# Patient Record
Sex: FEMALE | Race: WHITE | Employment: FULL TIME | URBAN - METROPOLITAN AREA
[De-identification: names, ages, dates, MRNs, and addresses within clinical notes are randomized per-mention and may not be internally consistent; named-entity substitution may affect disease eponyms.]

---

## 2017-03-30 DIAGNOSIS — I10 UNSPECIFIED ESSENTIAL HYPERTENSION: ICD-10-CM

## 2017-03-31 RX ORDER — ATENOLOL 50 MG/1
TABLET ORAL
Qty: 30 TABLET | Refills: 2 | Status: SHIPPED | OUTPATIENT
Start: 2017-03-31 | End: 2017-06-26 | Stop reason: SDUPTHER

## 2017-03-31 RX ORDER — NIFEDIPINE 60 MG/1
TABLET, FILM COATED, EXTENDED RELEASE ORAL
Qty: 30 TABLET | Refills: 2 | Status: SHIPPED | OUTPATIENT
Start: 2017-03-31 | End: 2017-06-26 | Stop reason: SDUPTHER

## 2017-06-26 DIAGNOSIS — I10 UNSPECIFIED ESSENTIAL HYPERTENSION: ICD-10-CM

## 2017-06-26 RX ORDER — ATENOLOL 50 MG/1
TABLET ORAL
Qty: 30 TABLET | Refills: 0 | Status: SHIPPED | OUTPATIENT
Start: 2017-06-26 | End: 2017-07-26 | Stop reason: SDUPTHER

## 2017-06-26 RX ORDER — NIFEDIPINE 60 MG/1
TABLET, FILM COATED, EXTENDED RELEASE ORAL
Qty: 30 TABLET | Refills: 0 | Status: SHIPPED | OUTPATIENT
Start: 2017-06-26 | End: 2017-07-22 | Stop reason: SDUPTHER

## 2017-07-22 DIAGNOSIS — I10 UNSPECIFIED ESSENTIAL HYPERTENSION: ICD-10-CM

## 2017-07-24 RX ORDER — ATENOLOL 50 MG/1
TABLET ORAL
Qty: 30 TABLET | Refills: 0 | OUTPATIENT
Start: 2017-07-24

## 2017-07-24 RX ORDER — NIFEDIPINE 60 MG/1
TABLET, FILM COATED, EXTENDED RELEASE ORAL
Qty: 30 TABLET | Refills: 0 | Status: SHIPPED | OUTPATIENT
Start: 2017-07-24 | End: 2017-08-31 | Stop reason: SDUPTHER

## 2017-07-26 DIAGNOSIS — I10 UNSPECIFIED ESSENTIAL HYPERTENSION: ICD-10-CM

## 2017-07-26 RX ORDER — NIFEDIPINE 60 MG/1
TABLET, FILM COATED, EXTENDED RELEASE ORAL
Qty: 30 TABLET | Refills: 0 | OUTPATIENT
Start: 2017-07-26

## 2017-07-26 RX ORDER — ATENOLOL 50 MG/1
TABLET ORAL
Qty: 30 TABLET | Refills: 0 | Status: SHIPPED | OUTPATIENT
Start: 2017-07-26 | End: 2017-09-26 | Stop reason: SDUPTHER

## 2017-08-28 ENCOUNTER — OFFICE VISIT (OUTPATIENT)
Dept: FAMILY MEDICINE CLINIC | Age: 52
End: 2017-08-28
Payer: COMMERCIAL

## 2017-08-28 VITALS
DIASTOLIC BLOOD PRESSURE: 78 MMHG | OXYGEN SATURATION: 97 % | WEIGHT: 200 LBS | BODY MASS INDEX: 37.76 KG/M2 | HEART RATE: 73 BPM | TEMPERATURE: 97.9 F | HEIGHT: 61 IN | SYSTOLIC BLOOD PRESSURE: 124 MMHG

## 2017-08-28 DIAGNOSIS — I10 ESSENTIAL HYPERTENSION: Primary | ICD-10-CM

## 2017-08-28 DIAGNOSIS — E78.5 HYPERLIPIDEMIA, UNSPECIFIED HYPERLIPIDEMIA TYPE: ICD-10-CM

## 2017-08-28 DIAGNOSIS — R05.9 COUGH: ICD-10-CM

## 2017-08-28 DIAGNOSIS — R53.83 FATIGUE, UNSPECIFIED TYPE: ICD-10-CM

## 2017-08-28 PROCEDURE — 99213 OFFICE O/P EST LOW 20 MIN: CPT | Performed by: FAMILY MEDICINE

## 2017-08-28 RX ORDER — CHLORHEXIDINE GLUCONATE 0.12 MG/ML
RINSE ORAL
Refills: 1 | COMMUNITY
Start: 2017-06-01

## 2017-08-28 ASSESSMENT — PATIENT HEALTH QUESTIONNAIRE - PHQ9
1. LITTLE INTEREST OR PLEASURE IN DOING THINGS: 0
SUM OF ALL RESPONSES TO PHQ9 QUESTIONS 1 & 2: 0
2. FEELING DOWN, DEPRESSED OR HOPELESS: 0
SUM OF ALL RESPONSES TO PHQ QUESTIONS 1-9: 0

## 2017-08-28 ASSESSMENT — ENCOUNTER SYMPTOMS
SORE THROAT: 0
NAUSEA: 0
CONSTIPATION: 0
SHORTNESS OF BREATH: 0
ABDOMINAL PAIN: 0

## 2017-08-31 DIAGNOSIS — I10 UNSPECIFIED ESSENTIAL HYPERTENSION: ICD-10-CM

## 2017-08-31 RX ORDER — NIFEDIPINE 60 MG/1
TABLET, FILM COATED, EXTENDED RELEASE ORAL
Qty: 30 TABLET | Refills: 2 | Status: SHIPPED | OUTPATIENT
Start: 2017-08-31 | End: 2017-12-28 | Stop reason: SDUPTHER

## 2017-08-31 RX ORDER — METOPROLOL SUCCINATE 50 MG/1
TABLET, EXTENDED RELEASE ORAL
Qty: 30 TABLET | Refills: 2 | Status: SHIPPED | OUTPATIENT
Start: 2017-08-31 | End: 2018-05-10 | Stop reason: ALTCHOICE

## 2017-09-26 RX ORDER — ATENOLOL 50 MG/1
TABLET ORAL
Qty: 30 TABLET | Refills: 2 | Status: SHIPPED | OUTPATIENT
Start: 2017-09-26 | End: 2017-12-28 | Stop reason: SDUPTHER

## 2017-10-20 ENCOUNTER — HOSPITAL ENCOUNTER (OUTPATIENT)
Age: 52
Discharge: HOME | End: 2017-10-20
Payer: COMMERCIAL

## 2017-10-20 DIAGNOSIS — I10 ESSENTIAL HYPERTENSION: ICD-10-CM

## 2017-10-20 DIAGNOSIS — E78.5 HYPERLIPIDEMIA, UNSPECIFIED HYPERLIPIDEMIA TYPE: ICD-10-CM

## 2017-10-20 DIAGNOSIS — R53.83 FATIGUE, UNSPECIFIED TYPE: ICD-10-CM

## 2017-10-20 LAB
ANION GAP SERPL CALCULATED.3IONS-SCNC: 10 MMOL/L (ref 9–17)
BUN BLDV-MCNC: 12 MG/DL (ref 6–20)
BUN/CREAT BLD: ABNORMAL (ref 9–20)
CALCIUM SERPL-MCNC: 9.1 MG/DL (ref 8.6–10.4)
CHLORIDE BLD-SCNC: 104 MMOL/L (ref 98–107)
CHOLESTEROL/HDL RATIO: 3.9
CHOLESTEROL: 210 MG/DL
CO2: 27 MMOL/L (ref 20–31)
CREAT SERPL-MCNC: 0.57 MG/DL (ref 0.5–0.9)
GFR AFRICAN AMERICAN: >60 ML/MIN
GFR NON-AFRICAN AMERICAN: >60 ML/MIN
GFR SERPL CREATININE-BSD FRML MDRD: ABNORMAL ML/MIN/{1.73_M2}
GFR SERPL CREATININE-BSD FRML MDRD: ABNORMAL ML/MIN/{1.73_M2}
GLUCOSE BLD-MCNC: 100 MG/DL (ref 70–99)
HCT VFR BLD CALC: 41.5 % (ref 36–46)
HDLC SERPL-MCNC: 54 MG/DL
HEMOGLOBIN: 14 G/DL (ref 12–16)
LDL CHOLESTEROL: 119 MG/DL (ref 0–130)
MCH RBC QN AUTO: 28.7 PG (ref 26–34)
MCHC RBC AUTO-ENTMCNC: 33.7 G/DL (ref 31–37)
MCV RBC AUTO: 85.3 FL (ref 80–100)
PDW BLD-RTO: 13 % (ref 11.5–14.9)
PLATELET # BLD: 274 K/UL (ref 150–450)
PMV BLD AUTO: 8.4 FL (ref 6–12)
POTASSIUM SERPL-SCNC: 4.1 MMOL/L (ref 3.7–5.3)
RBC # BLD: 4.87 M/UL (ref 4–5.2)
SODIUM BLD-SCNC: 141 MMOL/L (ref 135–144)
TRIGL SERPL-MCNC: 185 MG/DL
VLDLC SERPL CALC-MCNC: ABNORMAL MG/DL (ref 1–30)
WBC # BLD: 6.8 K/UL (ref 3.5–11)

## 2017-10-20 PROCEDURE — 85027 COMPLETE CBC AUTOMATED: CPT

## 2017-10-20 PROCEDURE — 80048 BASIC METABOLIC PNL TOTAL CA: CPT

## 2017-10-20 PROCEDURE — 36415 COLL VENOUS BLD VENIPUNCTURE: CPT

## 2017-10-20 PROCEDURE — 80061 LIPID PANEL: CPT

## 2017-11-14 ENCOUNTER — HOSPITAL ENCOUNTER (EMERGENCY)
Age: 52
Discharge: HOME | End: 2017-11-14
Attending: EMERGENCY MEDICINE
Payer: COMMERCIAL

## 2017-11-14 VITALS
HEART RATE: 78 BPM | TEMPERATURE: 97.8 F | RESPIRATION RATE: 18 BRPM | BODY MASS INDEX: 37.38 KG/M2 | DIASTOLIC BLOOD PRESSURE: 78 MMHG | WEIGHT: 198 LBS | OXYGEN SATURATION: 95 % | SYSTOLIC BLOOD PRESSURE: 120 MMHG | HEIGHT: 61 IN

## 2017-11-14 DIAGNOSIS — M62.838 TRAPEZIUS MUSCLE SPASM: Primary | ICD-10-CM

## 2017-11-14 LAB
EKG ATRIAL RATE: 76 BPM
EKG P AXIS: 37 DEGREES
EKG P-R INTERVAL: 168 MS
EKG Q-T INTERVAL: 392 MS
EKG QRS DURATION: 88 MS
EKG QTC CALCULATION (BAZETT): 441 MS
EKG R AXIS: 4 DEGREES
EKG T AXIS: 39 DEGREES
EKG VENTRICULAR RATE: 76 BPM
MYOGLOBIN: <21 NG/ML (ref 25–58)
TROPONIN INTERP: ABNORMAL
TROPONIN T: <0.03 NG/ML

## 2017-11-14 PROCEDURE — 83874 ASSAY OF MYOGLOBIN: CPT

## 2017-11-14 PROCEDURE — 99284 EMERGENCY DEPT VISIT MOD MDM: CPT

## 2017-11-14 PROCEDURE — 96374 THER/PROPH/DIAG INJ IV PUSH: CPT

## 2017-11-14 PROCEDURE — 84484 ASSAY OF TROPONIN QUANT: CPT

## 2017-11-14 PROCEDURE — 36415 COLL VENOUS BLD VENIPUNCTURE: CPT

## 2017-11-14 PROCEDURE — 6360000002 HC RX W HCPCS: Performed by: EMERGENCY MEDICINE

## 2017-11-14 PROCEDURE — 93005 ELECTROCARDIOGRAM TRACING: CPT

## 2017-11-14 RX ORDER — KETOROLAC TROMETHAMINE 30 MG/ML
30 INJECTION, SOLUTION INTRAMUSCULAR; INTRAVENOUS ONCE
Status: COMPLETED | OUTPATIENT
Start: 2017-11-14 | End: 2017-11-14

## 2017-11-14 RX ORDER — CYCLOBENZAPRINE HCL 10 MG
10 TABLET ORAL 3 TIMES DAILY PRN
Qty: 15 TABLET | Refills: 0 | Status: SHIPPED | OUTPATIENT
Start: 2017-11-14 | End: 2017-11-24

## 2017-11-14 RX ORDER — HYDROCODONE BITARTRATE AND ACETAMINOPHEN 5; 325 MG/1; MG/1
1 TABLET ORAL EVERY 6 HOURS PRN
Qty: 10 TABLET | Refills: 0 | Status: SHIPPED | OUTPATIENT
Start: 2017-11-14 | End: 2017-11-21

## 2017-11-14 RX ADMIN — KETOROLAC TROMETHAMINE 30 MG: 30 INJECTION, SOLUTION INTRAMUSCULAR at 06:49

## 2017-11-14 ASSESSMENT — ENCOUNTER SYMPTOMS
RHINORRHEA: 0
BACK PAIN: 1
NAUSEA: 0
VOMITING: 0
COUGH: 0
COLOR CHANGE: 0
SHORTNESS OF BREATH: 0
CONSTIPATION: 0
EYE PAIN: 0
WHEEZING: 0
TROUBLE SWALLOWING: 0
FACIAL SWELLING: 0
EYE REDNESS: 0
EYE DISCHARGE: 0
CHEST TIGHTNESS: 0
ABDOMINAL PAIN: 0
BLOOD IN STOOL: 0
SORE THROAT: 0
SINUS PRESSURE: 0
DIARRHEA: 0

## 2017-11-14 ASSESSMENT — PAIN SCALES - GENERAL
PAINLEVEL_OUTOF10: 10
PAINLEVEL_OUTOF10: 7
PAINLEVEL_OUTOF10: 10

## 2017-11-14 ASSESSMENT — PAIN DESCRIPTION - FREQUENCY: FREQUENCY: CONTINUOUS

## 2017-11-14 ASSESSMENT — PAIN DESCRIPTION - LOCATION: LOCATION: NECK

## 2017-11-14 ASSESSMENT — PAIN DESCRIPTION - ORIENTATION: ORIENTATION: LEFT;RIGHT

## 2017-11-14 ASSESSMENT — PAIN DESCRIPTION - PAIN TYPE: TYPE: ACUTE PAIN

## 2017-11-14 ASSESSMENT — PAIN DESCRIPTION - ONSET: ONSET: ON-GOING

## 2017-11-14 NOTE — ED NOTES
Pt arrive to ED c/o neck pain since last Wed 11/8/17; pt reports feeling like she has a stiff neck and cannot turn her head to the left. The pain is currently 10/10, describes as tight and is tender to the touch. Pt reports even having a sweatshirt around the neck to keep warm is straining on her neck. Pt reports the pain is approx in the middle of her neck and radiates to both left and right shoulder but more so the left. Pt denies lifting any object the wrong way or straining her neck; pt denies falling. Pt denies N/V, fevers, diarrhea but does report always being constipated. Pt denies headache or change in vision from this neck pain. Pt denies problem/pain with urination. Pt alert/oriented x4.      David Lou RN  11/14/17 [id number]

## 2017-11-14 NOTE — ED PROVIDER NOTES
myalgias and neck pain. Skin: Negative for color change, pallor, rash and wound. Neurological: Negative for dizziness, tremors, seizures, syncope, speech difficulty, weakness, numbness and headaches. Psychiatric/Behavioral: Negative for confusion, decreased concentration, hallucinations, self-injury, sleep disturbance and suicidal ideas. PAST MEDICAL HISTORY     Past Medical History:   Diagnosis Date    Hypertension        SURGICAL HISTORY       Past Surgical History:   Procedure Laterality Date    ADRENAL GLAND SURGERY Left 2013     SECTION      HYSTERECTOMY      TONSILLECTOMY         CURRENT MEDICATIONS       Previous Medications    ATENOLOL (TENORMIN) 50 MG TABLET    TAKE 1 TABLET BY MOUTH ONE TIME A DAY     CHLORHEXIDINE (PERIDEX) 0.12 % SOLUTION    RINSE WITH 1/2 OUNCE BY MOUTH TWICE DAILY AFTER BREAKFAST AND BEFORE BEDTIME    ESTRADIOL (ESTRACE) 1 MG TABLET    Take 1 tablet by mouth daily. METOPROLOL SUCCINATE (TOPROL XL) 50 MG EXTENDED RELEASE TABLET    TAKE 1 TABLET BY MOUTH ONE TIME A DAY. PATIENT MUST MAKE AN APPOINMENT. NIFEDIPINE (ADALAT CC) 60 MG EXTENDED RELEASE TABLET    TAKE 1 TABLET BY MOUTH ONE TIME A DAY        ALLERGIES     is allergic to no known allergies. FAMILY HISTORY     indicated that her father is . She indicated that her paternal grandmother is . SOCIAL HISTORY      reports that she has quit smoking. Her smoking use included Cigarettes. She has a 10.00 pack-year smoking history. She has never used smokeless tobacco. She reports that she drinks alcohol. She reports that she does not use drugs. PHYSICAL EXAM     INITIAL VITALS: /77   Pulse 98   Temp 97.8 °F (36.6 °C) (Oral)   Resp 18   Ht 5' 1\" (1.549 m)   Wt 198 lb (89.8 kg)   SpO2 94%   BMI 37.41 kg/m²      Physical Exam   Constitutional: She is oriented to person, place, and time. She appears well-developed and well-nourished. No distress.    HENT:   Head: AM  Patient was updated on results and plan of care. CRITICAL CARE:   none    CONSULTS:  None    PROCEDURES:  none    FINAL IMPRESSION      1. Trapezius muscle spasm          DISPOSITION/PLAN   DISPOSITION Decision to Discharge    PATIENT REFERRED TO:  Key Meeks    In 1 week      [address] [id number] [phone]    If symptoms worsen      DISCHARGE MEDICATIONS:  New Prescriptions    CYCLOBENZAPRINE (FLEXERIL) 10 MG TABLET    Take 1 tablet by mouth 3 times daily as needed for Muscle spasms    HYDROCODONE-ACETAMINOPHEN (NORCO) 5-325 MG PER TABLET    Take 1 tablet by mouth every 6 hours as needed for Pain .        (Please note that portions of this note were completed with a voice recognition program.  Efforts were made to edit the dictations but occasionally words are mis-transcribed.)    Artemio Haynes MD  Attending Emergency Physician                      Artemio Haynes MD  11/14/17 0232

## 2017-12-28 DIAGNOSIS — I10 ESSENTIAL HYPERTENSION: ICD-10-CM

## 2017-12-28 RX ORDER — NIFEDIPINE 60 MG/1
TABLET, FILM COATED, EXTENDED RELEASE ORAL
Qty: 30 TABLET | Refills: 2 | Status: SHIPPED | OUTPATIENT
Start: 2017-12-28 | End: 2018-04-18 | Stop reason: SDUPTHER

## 2017-12-28 RX ORDER — ATENOLOL 50 MG/1
TABLET ORAL
Qty: 30 TABLET | Refills: 2 | Status: SHIPPED | OUTPATIENT
Start: 2017-12-28 | End: 2018-03-18 | Stop reason: SDUPTHER

## 2018-01-23 ENCOUNTER — HOSPITAL ENCOUNTER (OUTPATIENT)
Dept: WOMENS IMAGING | Age: 53
Discharge: HOME | End: 2018-01-23
Payer: COMMERCIAL

## 2018-01-23 DIAGNOSIS — Z12.39 SCREENING BREAST EXAMINATION: ICD-10-CM

## 2018-01-23 PROCEDURE — 77063 BREAST TOMOSYNTHESIS BI: CPT

## 2018-03-19 RX ORDER — ATENOLOL 50 MG/1
TABLET ORAL
Qty: 30 TABLET | Refills: 1 | Status: SHIPPED | OUTPATIENT
Start: 2018-03-19 | End: 2018-05-26 | Stop reason: SDUPTHER

## 2018-04-18 DIAGNOSIS — I10 ESSENTIAL HYPERTENSION: ICD-10-CM

## 2018-04-19 RX ORDER — NIFEDIPINE 60 MG/1
TABLET, FILM COATED, EXTENDED RELEASE ORAL
Qty: 30 TABLET | Refills: 1 | Status: SHIPPED | OUTPATIENT
Start: 2018-04-19 | End: 2018-06-19 | Stop reason: SDUPTHER

## 2018-05-10 ENCOUNTER — OFFICE VISIT (OUTPATIENT)
Dept: FAMILY MEDICINE CLINIC | Age: 53
End: 2018-05-10
Payer: COMMERCIAL

## 2018-05-10 VITALS
WEIGHT: 201 LBS | TEMPERATURE: 97.9 F | OXYGEN SATURATION: 97 % | BODY MASS INDEX: 37.98 KG/M2 | SYSTOLIC BLOOD PRESSURE: 110 MMHG | DIASTOLIC BLOOD PRESSURE: 70 MMHG | HEART RATE: 83 BPM

## 2018-05-10 DIAGNOSIS — I10 ESSENTIAL HYPERTENSION: Primary | ICD-10-CM

## 2018-05-10 DIAGNOSIS — R53.83 FATIGUE, UNSPECIFIED TYPE: ICD-10-CM

## 2018-05-10 DIAGNOSIS — K21.9 GASTROESOPHAGEAL REFLUX DISEASE WITHOUT ESOPHAGITIS: ICD-10-CM

## 2018-05-10 DIAGNOSIS — K64.9 HEMORRHOIDS, UNSPECIFIED HEMORRHOID TYPE: ICD-10-CM

## 2018-05-10 DIAGNOSIS — E78.5 HYPERLIPIDEMIA, UNSPECIFIED HYPERLIPIDEMIA TYPE: ICD-10-CM

## 2018-05-10 PROCEDURE — 99213 OFFICE O/P EST LOW 20 MIN: CPT | Performed by: FAMILY MEDICINE

## 2018-05-10 RX ORDER — OMEPRAZOLE 20 MG/1
20 CAPSULE, DELAYED RELEASE ORAL DAILY
Qty: 30 CAPSULE | Refills: 3 | Status: SHIPPED | OUTPATIENT
Start: 2018-05-10 | End: 2018-08-30

## 2018-05-10 ASSESSMENT — ENCOUNTER SYMPTOMS
SORE THROAT: 0
COUGH: 0
ANAL BLEEDING: 1
SHORTNESS OF BREATH: 0
CONSTIPATION: 1
NAUSEA: 0
ABDOMINAL PAIN: 0

## 2018-05-12 ENCOUNTER — HOSPITAL ENCOUNTER (OUTPATIENT)
Age: 53
Discharge: HOME | End: 2018-05-12
Payer: COMMERCIAL

## 2018-05-12 DIAGNOSIS — E78.5 HYPERLIPIDEMIA, UNSPECIFIED HYPERLIPIDEMIA TYPE: ICD-10-CM

## 2018-05-12 DIAGNOSIS — I10 ESSENTIAL HYPERTENSION: ICD-10-CM

## 2018-05-12 DIAGNOSIS — R53.83 FATIGUE, UNSPECIFIED TYPE: ICD-10-CM

## 2018-05-12 LAB
ANION GAP SERPL CALCULATED.3IONS-SCNC: 11 MMOL/L (ref 9–17)
BUN BLDV-MCNC: 11 MG/DL (ref 6–20)
BUN/CREAT BLD: NORMAL (ref 9–20)
CALCIUM SERPL-MCNC: 8.7 MG/DL (ref 8.6–10.4)
CHLORIDE BLD-SCNC: 102 MMOL/L (ref 98–107)
CHOLESTEROL/HDL RATIO: 3.7
CHOLESTEROL: 199 MG/DL
CO2: 29 MMOL/L (ref 20–31)
CREAT SERPL-MCNC: 0.53 MG/DL (ref 0.5–0.9)
GFR AFRICAN AMERICAN: >60 ML/MIN
GFR NON-AFRICAN AMERICAN: >60 ML/MIN
GFR SERPL CREATININE-BSD FRML MDRD: NORMAL ML/MIN/{1.73_M2}
GFR SERPL CREATININE-BSD FRML MDRD: NORMAL ML/MIN/{1.73_M2}
GLUCOSE BLD-MCNC: 93 MG/DL (ref 70–99)
GLUCOSE FASTING: 93 MG/DL (ref 70–99)
HDLC SERPL-MCNC: 54 MG/DL
LDL CHOLESTEROL: 106 MG/DL (ref 0–130)
POTASSIUM SERPL-SCNC: 3.8 MMOL/L (ref 3.7–5.3)
SODIUM BLD-SCNC: 142 MMOL/L (ref 135–144)
THYROXINE, FREE: 1.28 NG/DL (ref 0.93–1.7)
TRIGL SERPL-MCNC: 194 MG/DL
TSH SERPL DL<=0.05 MIU/L-ACNC: 1.85 MIU/L (ref 0.3–5)
VLDLC SERPL CALC-MCNC: ABNORMAL MG/DL (ref 1–30)

## 2018-05-12 PROCEDURE — 36415 COLL VENOUS BLD VENIPUNCTURE: CPT

## 2018-05-12 PROCEDURE — 80048 BASIC METABOLIC PNL TOTAL CA: CPT

## 2018-05-12 PROCEDURE — 80061 LIPID PANEL: CPT

## 2018-05-12 PROCEDURE — 84439 ASSAY OF FREE THYROXINE: CPT

## 2018-05-12 PROCEDURE — 84443 ASSAY THYROID STIM HORMONE: CPT

## 2018-05-26 RX ORDER — ATENOLOL 50 MG/1
TABLET ORAL
Qty: 30 TABLET | Refills: 1 | Status: SHIPPED | OUTPATIENT
Start: 2018-05-26 | End: 2018-07-21 | Stop reason: SDUPTHER

## 2018-06-19 DIAGNOSIS — I10 ESSENTIAL HYPERTENSION: ICD-10-CM

## 2018-06-19 RX ORDER — NIFEDIPINE 60 MG/1
TABLET, FILM COATED, EXTENDED RELEASE ORAL
Qty: 30 TABLET | Refills: 1 | Status: SHIPPED | OUTPATIENT
Start: 2018-06-19 | End: 2018-07-26 | Stop reason: SDUPTHER

## 2018-07-23 RX ORDER — ATENOLOL 50 MG/1
TABLET ORAL
Qty: 30 TABLET | Refills: 0 | Status: SHIPPED | OUTPATIENT
Start: 2018-07-23 | End: 2018-07-24 | Stop reason: SDUPTHER

## 2018-07-24 DIAGNOSIS — I10 ESSENTIAL HYPERTENSION: ICD-10-CM

## 2018-07-24 RX ORDER — ATENOLOL 50 MG/1
TABLET ORAL
Qty: 30 TABLET | Refills: 0 | Status: SHIPPED | OUTPATIENT
Start: 2018-07-24 | End: 2018-10-04 | Stop reason: SDUPTHER

## 2018-07-26 RX ORDER — NIFEDIPINE 60 MG/1
TABLET, FILM COATED, EXTENDED RELEASE ORAL
Qty: 30 TABLET | Refills: 0 | Status: SHIPPED | OUTPATIENT
Start: 2018-07-26 | End: 2018-08-20 | Stop reason: SDUPTHER

## 2018-08-19 DIAGNOSIS — I10 ESSENTIAL HYPERTENSION: ICD-10-CM

## 2018-08-20 RX ORDER — NIFEDIPINE 60 MG/1
TABLET, FILM COATED, EXTENDED RELEASE ORAL
Qty: 30 TABLET | Refills: 0 | Status: SHIPPED | OUTPATIENT
Start: 2018-08-20 | End: 2018-12-13 | Stop reason: SDUPTHER

## 2018-08-30 PROBLEM — K64.9 HEMORRHOIDS: Status: ACTIVE | Noted: 2018-08-30

## 2018-08-30 PROBLEM — K05.6 CHRONIC PERIODONTAL DISEASE: Status: ACTIVE | Noted: 2018-08-30

## 2018-12-13 ENCOUNTER — OFFICE VISIT (OUTPATIENT)
Dept: PRIMARY CARE CLINIC | Age: 53
End: 2018-12-13
Payer: COMMERCIAL

## 2018-12-13 VITALS
DIASTOLIC BLOOD PRESSURE: 74 MMHG | WEIGHT: 208.2 LBS | OXYGEN SATURATION: 97 % | SYSTOLIC BLOOD PRESSURE: 126 MMHG | BODY MASS INDEX: 38.89 KG/M2 | HEART RATE: 61 BPM

## 2018-12-13 DIAGNOSIS — Z11.59 NEED FOR HEPATITIS C SCREENING TEST: ICD-10-CM

## 2018-12-13 DIAGNOSIS — Z11.4 ENCOUNTER FOR SCREENING FOR HIV: ICD-10-CM

## 2018-12-13 DIAGNOSIS — R53.83 FATIGUE, UNSPECIFIED TYPE: ICD-10-CM

## 2018-12-13 DIAGNOSIS — K59.04 CHRONIC IDIOPATHIC CONSTIPATION: ICD-10-CM

## 2018-12-13 DIAGNOSIS — Z23 NEED FOR VACCINATION: ICD-10-CM

## 2018-12-13 DIAGNOSIS — I10 ESSENTIAL HYPERTENSION: Primary | ICD-10-CM

## 2018-12-13 PROCEDURE — 99214 OFFICE O/P EST MOD 30 MIN: CPT | Performed by: PHYSICIAN ASSISTANT

## 2018-12-13 RX ORDER — NIFEDIPINE 60 MG/1
60 TABLET, FILM COATED, EXTENDED RELEASE ORAL DAILY
Qty: 30 TABLET | Refills: 5 | Status: SHIPPED | OUTPATIENT
Start: 2018-12-13 | End: 2019-05-27 | Stop reason: SDUPTHER

## 2018-12-13 RX ORDER — ATENOLOL 50 MG/1
TABLET ORAL
Qty: 30 TABLET | Refills: 5 | Status: SHIPPED | OUTPATIENT
Start: 2018-12-13 | End: 2019-06-26 | Stop reason: SDUPTHER

## 2018-12-13 ASSESSMENT — ENCOUNTER SYMPTOMS
ANAL BLEEDING: 0
ABDOMINAL PAIN: 0
APNEA: 0
COUGH: 0
SHORTNESS OF BREATH: 0
CHEST TIGHTNESS: 0
BLOOD IN STOOL: 0
CONSTIPATION: 1
COLOR CHANGE: 0

## 2018-12-13 NOTE — PROGRESS NOTES
[address] [facility] PRIMARY CARE  [address] [address] [facility]  Dept: [phone]    Burt Stringer is a 48 y.o. female who presents today for her medical conditions/complaintsas noted below. Chief Complaint   Patient presents with    Follow-up     4 month recheck on weight, htn, and meds. Doing ok.  Fatigue     seems to never have any energy. has been going on for some time. feels like she only works, sleeps, and gains weight. HPI:     HPI  Has always been constipated and it will even lead to her having neck tightness and HA which resolves as soon as she has a BM  It has been 4 days now since having BM  Colonoscopy [id number] showed hemorrhoids and diverticulosis    Sleeps fine and no drowsiness at work, but only getting about 6 hours daily. No exercise.       LDL Cholesterol (mg/dL)   Date Value   2018 106   10/20/2017 119   10/29/2016 117       (goal LDL is <100)   AST (U/L)   Date Value   2014 15     ALT (U/L)   Date Value   2014 14     BUN (mg/dL)   Date Value   2018 11     BP Readings from Last 3 Encounters:   18 126/74   18 138/82   05/10/18 110/70          (goal 120/80)    Past Medical History:   Diagnosis Date    Hypertension       Past Surgical History:   Procedure Laterality Date    ADRENAL GLAND SURGERY Left 2013     SECTION      HYSTERECTOMY      with family hx of ovarian cancer    TONSILLECTOMY         Family History   Problem Relation Age of Onset    Diabetes Father     Diabetes Paternal Grandmother     Ovarian Cancer Mother     Diabetes Paternal Cousin         2    Colon Cancer Brother     Breast Cancer Maternal Aunt     Thyroid Cancer Other     Other Other         retinoblastoma    Other Son         suicide       Social History   Substance Use Topics    Smoking status: Current Every Day Smoker     Packs/day: 0.50     Years: 20.00     Types: Cigarettes    Smokeless tobacco: Never Used  Alcohol use 0.0 oz/week      Comment: socially      Current Outpatient Prescriptions   Medication Sig Dispense Refill    atenolol (TENORMIN) 50 MG tablet TAKE 1 TABLET BY MOUTH ONE TIME A DAY 30 tablet 5    NIFEdipine (ADALAT CC) 60 MG extended release tablet Take 1 tablet by mouth daily 30 tablet 5    linaclotide (LINZESS) 145 MCG capsule Take 1 capsule by mouth every morning (before breakfast) 30 capsule 1    chlorhexidine (PERIDEX) 0.12 % solution RINSE WITH 1/2 OUNCE BY MOUTH TWICE DAILY AFTER BREAKFAST AND BEFORE BEDTIME  1    estradiol (ESTRACE) 1 MG tablet Take 1 tablet by mouth daily. No current facility-administered medications for this visit. Allergies   Allergen Reactions    No Known Allergies        Health Maintenance   Topic Date Due    Hepatitis C screen  1965    HIV screen  09/21/1980    Pneumococcal med risk (1 of 1 - PPSV23) 09/21/1984    Shingles Vaccine (1 of 2 - 2 Dose Series) 09/21/2015    Cervical cancer screen  10/09/2017    Flu vaccine (1) 09/01/2018    Breast cancer screen  01/23/2020    Colon cancer screen colonoscopy  03/17/2021    Lipid screen  05/12/2023    DTaP/Tdap/Td vaccine (2 - Td) 06/11/2025       Subjective:      Review of Systems   Constitutional: Negative for fatigue. Respiratory: Negative for apnea, cough, chest tightness and shortness of breath. Cardiovascular: Negative for chest pain, palpitations and leg swelling. Gastrointestinal: Positive for constipation. Negative for abdominal pain, anal bleeding and blood in stool. Genitourinary: Negative. Skin: Negative for color change. Neurological: Negative for dizziness, syncope, weakness, light-headedness and headaches. Psychiatric/Behavioral: Positive for sleep disturbance (works third shift so gets less than 6 hours sleep when she dioes sleep.  ). The patient is not nervous/anxious.         Objective:     /74   Pulse 61   Wt 208 lb 3.2 oz (94.4 kg)   SpO2 97%   BMI

## 2019-02-07 ENCOUNTER — HOSPITAL ENCOUNTER (OUTPATIENT)
Dept: WOMENS IMAGING | Age: 54
Discharge: HOME | End: 2019-02-09
Payer: COMMERCIAL

## 2019-02-07 ENCOUNTER — HOSPITAL ENCOUNTER (OUTPATIENT)
Age: 54
Discharge: HOME | End: 2019-02-07
Payer: COMMERCIAL

## 2019-02-07 DIAGNOSIS — Z12.39 SCREENING BREAST EXAMINATION: ICD-10-CM

## 2019-02-07 DIAGNOSIS — Z11.4 ENCOUNTER FOR SCREENING FOR HIV: ICD-10-CM

## 2019-02-07 DIAGNOSIS — Z11.59 NEED FOR HEPATITIS C SCREENING TEST: ICD-10-CM

## 2019-02-07 LAB
HEPATITIS C ANTIBODY: NONREACTIVE
HIV AG/AB: NONREACTIVE

## 2019-02-07 PROCEDURE — 77063 BREAST TOMOSYNTHESIS BI: CPT

## 2019-02-07 PROCEDURE — 87389 HIV-1 AG W/HIV-1&-2 AB AG IA: CPT

## 2019-02-07 PROCEDURE — 86803 HEPATITIS C AB TEST: CPT

## 2019-02-07 PROCEDURE — 36415 COLL VENOUS BLD VENIPUNCTURE: CPT

## 2019-05-27 DIAGNOSIS — I10 ESSENTIAL HYPERTENSION: ICD-10-CM

## 2019-05-28 ENCOUNTER — HOSPITAL ENCOUNTER (OUTPATIENT)
Age: 54
Setting detail: SPECIMEN
Discharge: HOME | End: 2019-05-28
Payer: COMMERCIAL

## 2019-05-28 ENCOUNTER — OFFICE VISIT (OUTPATIENT)
Dept: PRIMARY CARE CLINIC | Age: 54
End: 2019-05-28
Payer: COMMERCIAL

## 2019-05-28 VITALS
BODY MASS INDEX: 36.39 KG/M2 | HEART RATE: 76 BPM | SYSTOLIC BLOOD PRESSURE: 120 MMHG | WEIGHT: 194.8 LBS | DIASTOLIC BLOOD PRESSURE: 80 MMHG | OXYGEN SATURATION: 97 %

## 2019-05-28 DIAGNOSIS — I10 ESSENTIAL HYPERTENSION: ICD-10-CM

## 2019-05-28 DIAGNOSIS — R07.89 STERNAL PAIN: ICD-10-CM

## 2019-05-28 DIAGNOSIS — E78.5 HYPERLIPIDEMIA, UNSPECIFIED HYPERLIPIDEMIA TYPE: ICD-10-CM

## 2019-05-28 DIAGNOSIS — R53.83 FATIGUE, UNSPECIFIED TYPE: ICD-10-CM

## 2019-05-28 DIAGNOSIS — Z23 NEED FOR VACCINATION: Primary | ICD-10-CM

## 2019-05-28 LAB
ABSOLUTE EOS #: 0.23 K/UL (ref 0–0.44)
ABSOLUTE IMMATURE GRANULOCYTE: 0.03 K/UL (ref 0–0.3)
ABSOLUTE LYMPH #: 2.39 K/UL (ref 1.1–3.7)
ABSOLUTE MONO #: 0.71 K/UL (ref 0.1–1.2)
ALBUMIN SERPL-MCNC: 3.9 G/DL (ref 3.5–5.2)
ALBUMIN/GLOBULIN RATIO: 1.1 (ref 1–2.5)
ALP BLD-CCNC: 92 U/L (ref 35–104)
ALT SERPL-CCNC: 29 U/L (ref 5–33)
ANION GAP SERPL CALCULATED.3IONS-SCNC: 12 MMOL/L (ref 9–17)
AST SERPL-CCNC: 25 U/L
BASOPHILS # BLD: 1 % (ref 0–2)
BASOPHILS ABSOLUTE: 0.05 K/UL (ref 0–0.2)
BILIRUB SERPL-MCNC: 0.5 MG/DL (ref 0.3–1.2)
BUN BLDV-MCNC: 14 MG/DL (ref 6–20)
BUN/CREAT BLD: ABNORMAL (ref 9–20)
CALCIUM SERPL-MCNC: 9 MG/DL (ref 8.6–10.4)
CHLORIDE BLD-SCNC: 102 MMOL/L (ref 98–107)
CHOLESTEROL/HDL RATIO: 4.6
CHOLESTEROL: 243 MG/DL
CO2: 25 MMOL/L (ref 20–31)
CREAT SERPL-MCNC: 0.47 MG/DL (ref 0.5–0.9)
DIFFERENTIAL TYPE: ABNORMAL
EOSINOPHILS RELATIVE PERCENT: 2 % (ref 1–4)
GFR AFRICAN AMERICAN: >60 ML/MIN
GFR NON-AFRICAN AMERICAN: >60 ML/MIN
GFR SERPL CREATININE-BSD FRML MDRD: ABNORMAL ML/MIN/{1.73_M2}
GFR SERPL CREATININE-BSD FRML MDRD: ABNORMAL ML/MIN/{1.73_M2}
GLUCOSE FASTING: 92 MG/DL (ref 70–99)
HCT VFR BLD CALC: 47.2 % (ref 36.3–47.1)
HDLC SERPL-MCNC: 53 MG/DL
HEMOGLOBIN: 14.7 G/DL (ref 11.9–15.1)
IMMATURE GRANULOCYTES: 0 %
LDL CHOLESTEROL: 136 MG/DL (ref 0–130)
LYMPHOCYTES # BLD: 23 % (ref 24–43)
MCH RBC QN AUTO: 27.7 PG (ref 25.2–33.5)
MCHC RBC AUTO-ENTMCNC: 31.1 G/DL (ref 28.4–34.8)
MCV RBC AUTO: 88.9 FL (ref 82.6–102.9)
MONOCYTES # BLD: 7 % (ref 3–12)
NRBC AUTOMATED: 0 PER 100 WBC
PDW BLD-RTO: 12.8 % (ref 11.8–14.4)
PLATELET # BLD: 342 K/UL (ref 138–453)
PLATELET ESTIMATE: ABNORMAL
PMV BLD AUTO: 11 FL (ref 8.1–13.5)
POTASSIUM SERPL-SCNC: 4.3 MMOL/L (ref 3.7–5.3)
RBC # BLD: 5.31 M/UL (ref 3.95–5.11)
RBC # BLD: ABNORMAL 10*6/UL
SEG NEUTROPHILS: 67 % (ref 36–65)
SEGMENTED NEUTROPHILS ABSOLUTE COUNT: 7.13 K/UL (ref 1.5–8.1)
SODIUM BLD-SCNC: 139 MMOL/L (ref 135–144)
TOTAL PROTEIN: 7.4 G/DL (ref 6.4–8.3)
TRIGL SERPL-MCNC: 272 MG/DL
TSH SERPL DL<=0.05 MIU/L-ACNC: 3.01 MIU/L (ref 0.3–5)
VLDLC SERPL CALC-MCNC: ABNORMAL MG/DL (ref 1–30)
WBC # BLD: 10.5 K/UL (ref 3.5–11.3)
WBC # BLD: ABNORMAL 10*3/UL

## 2019-05-28 PROCEDURE — 90732 PPSV23 VACC 2 YRS+ SUBQ/IM: CPT | Performed by: PHYSICIAN ASSISTANT

## 2019-05-28 PROCEDURE — 99214 OFFICE O/P EST MOD 30 MIN: CPT | Performed by: PHYSICIAN ASSISTANT

## 2019-05-28 PROCEDURE — 90471 IMMUNIZATION ADMIN: CPT | Performed by: PHYSICIAN ASSISTANT

## 2019-05-28 RX ORDER — PANTOPRAZOLE SODIUM 40 MG/1
40 TABLET, DELAYED RELEASE ORAL
Qty: 60 TABLET | Refills: 0 | Status: SHIPPED | OUTPATIENT
Start: 2019-05-28 | End: 2019-06-18 | Stop reason: SDUPTHER

## 2019-05-28 RX ORDER — NIFEDIPINE 60 MG/1
TABLET, FILM COATED, EXTENDED RELEASE ORAL
Qty: 30 TABLET | Refills: 5 | Status: SHIPPED | OUTPATIENT
Start: 2019-05-28 | End: 2019-12-24

## 2019-05-28 ASSESSMENT — ENCOUNTER SYMPTOMS
COUGH: 1
ABDOMINAL DISTENTION: 1
NAUSEA: 0
DIARRHEA: 0
ABDOMINAL PAIN: 0
CHEST TIGHTNESS: 0
WHEEZING: 0
VOMITING: 0
BACK PAIN: 0
SHORTNESS OF BREATH: 0
ANAL BLEEDING: 0
BLOOD IN STOOL: 0
CONSTIPATION: 0

## 2019-05-28 ASSESSMENT — PATIENT HEALTH QUESTIONNAIRE - PHQ9
1. LITTLE INTEREST OR PLEASURE IN DOING THINGS: 0
2. FEELING DOWN, DEPRESSED OR HOPELESS: 0
SUM OF ALL RESPONSES TO PHQ9 QUESTIONS 1 & 2: 0
SUM OF ALL RESPONSES TO PHQ QUESTIONS 1-9: 0
SUM OF ALL RESPONSES TO PHQ QUESTIONS 1-9: 0

## 2019-05-28 NOTE — PROGRESS NOTES
for cough. Negative for chest tightness, shortness of breath and wheezing. Cardiovascular: Negative for chest pain, palpitations and leg swelling. Gastrointestinal: Positive for abdominal distention (feels a little lump in upper belly). Negative for abdominal pain, anal bleeding, blood in stool, constipation, diarrhea, nausea and vomiting. Endocrine: Negative. Musculoskeletal: Negative for back pain. Neurological: Positive for numbness (Left arm \"falls asleep\" at night when she lays on it). Negative for dizziness, syncope and light-headedness. Objective:     /80   Pulse 76   Wt 194 lb 12.8 oz (88.4 kg)   SpO2 97%   BMI 36.39 kg/m²   Physical Exam   Cardiovascular: Normal rate, regular rhythm and normal heart sounds. Pulmonary/Chest: Effort normal and breath sounds normal.   Abdominal: Soft. Bowel sounds are normal. She exhibits no distension and no mass. There is no hepatosplenomegaly. There is no tenderness. There is no rigidity, no rebound, no guarding, no CVA tenderness, no tenderness at McBurney's point and negative Wilkins's sign. No hernia. Small subq lump on left upper abdomen   Vitals reviewed. Assessment:       Diagnosis Orders   1. Need for vaccination  Pneumococcal polysaccharide vaccine 23-valent greater than or equal to 3yo subcutaneous/IM   2. Sternal pain  pantoprazole (PROTONIX) 40 MG tablet   3. Hyperlipidemia, unspecified hyperlipidemia type  Lipid Panel   4. Fatigue, unspecified type  CBC Auto Differential    TSH without Reflex   5. Essential hypertension  Comprehensive Metabolic Panel, Fasting        Plan:    Start Pantoprazole 40mg 1 po qd  [place] diet--avoid spicy, citrus, acidic, fatty foods. BW ordered today  If symptoms not resolving or CP, SOB on exertion, then cardiac work-up. Discussed tobacco cessation. Return in about 3 weeks (around 6/18/2019) for recheck.     Orders Placed This Encounter   Procedures    Pneumococcal polysaccharide vaccine

## 2019-06-18 ENCOUNTER — OFFICE VISIT (OUTPATIENT)
Dept: PRIMARY CARE CLINIC | Age: 54
End: 2019-06-18
Payer: COMMERCIAL

## 2019-06-18 VITALS
HEART RATE: 69 BPM | DIASTOLIC BLOOD PRESSURE: 82 MMHG | SYSTOLIC BLOOD PRESSURE: 138 MMHG | BODY MASS INDEX: 37.36 KG/M2 | OXYGEN SATURATION: 97 % | WEIGHT: 200 LBS

## 2019-06-18 DIAGNOSIS — I10 ESSENTIAL HYPERTENSION: ICD-10-CM

## 2019-06-18 DIAGNOSIS — E78.5 HYPERLIPIDEMIA, UNSPECIFIED HYPERLIPIDEMIA TYPE: Primary | ICD-10-CM

## 2019-06-18 PROCEDURE — 99213 OFFICE O/P EST LOW 20 MIN: CPT | Performed by: PHYSICIAN ASSISTANT

## 2019-06-18 RX ORDER — PANTOPRAZOLE SODIUM 40 MG/1
40 TABLET, DELAYED RELEASE ORAL DAILY
Qty: 30 TABLET | Refills: 2 | Status: SHIPPED | OUTPATIENT
Start: 2019-06-18 | End: 2020-07-23 | Stop reason: ALTCHOICE

## 2019-06-18 ASSESSMENT — ENCOUNTER SYMPTOMS
COUGH: 0
VOMITING: 0
SORE THROAT: 0
RHINORRHEA: 0
EYE DISCHARGE: 0
WHEEZING: 0
EYE REDNESS: 0
NAUSEA: 0
DIARRHEA: 0
SHORTNESS OF BREATH: 0
ABDOMINAL PAIN: 0

## 2019-06-18 NOTE — PROGRESS NOTES
[address] [facility] PRIMARY CARE  [id number] [address] [facility]  Dept: [phone]    Eunice Ferreira is a 48 y.o. female who presents today for her medical conditions/complaintsas noted below. Chief Complaint   Patient presents with    Heartburn     3 week follow up. Pt states the protonix is working, no severe heartburn since she started taking it.  Discuss Labs     Pt would like to go over her blood work.  Swelling     bilateral foot swelling while she was on the cruise and while she was in [place] last week. Pt states she slept with her feet on a pillow and that helped. HPI:     HPI  Labs from 19 reviewed: elevated cholesterol, glucose normal.      Protonix is working well. Taking it bid for 3 weeks now. No more sternal pain. No left arm, jaw, chest or upper back pain. NO SOB or CP on exertion. No melena, hemoptysis   Swelling in ankles is gone now that she is home.      LDL Cholesterol (mg/dL)   Date Value   2019 136 (H)   2018 106   10/20/2017 119       (goal LDL is <100)   AST (U/L)   Date Value   2019 25     ALT (U/L)   Date Value   2019 29     BUN (mg/dL)   Date Value   2019 14     BP Readings from Last 3 Encounters:   19 138/82   19 120/80   18 126/74          (goal 120/80)    Past Medical History:   Diagnosis Date    Hypertension       Past Surgical History:   Procedure Laterality Date    ADRENAL GLAND SURGERY Left 2013     SECTION      HYSTERECTOMY      with family hx of ovarian cancer    TONSILLECTOMY         Family History   Problem Relation Age of Onset    Diabetes Father     Diabetes Paternal Grandmother     Ovarian Cancer Mother     Diabetes Paternal Cousin         2    Colon Cancer Brother     Breast Cancer Maternal Aunt     Thyroid Cancer Other     Other Other         retinoblastoma    Other Son         suicide       Social History     Tobacco Use   

## 2019-11-14 ENCOUNTER — TELEPHONE (OUTPATIENT)
Dept: PRIMARY CARE CLINIC | Age: 54
End: 2019-11-14

## 2019-11-15 RX ORDER — ESTRADIOL 1 MG/1
1 TABLET ORAL DAILY
Qty: 21 TABLET | Refills: 3 | Status: SHIPPED | OUTPATIENT
Start: 2019-11-15 | End: 2020-02-17

## 2020-02-25 ENCOUNTER — HOSPITAL ENCOUNTER (OUTPATIENT)
Dept: WOMENS IMAGING | Age: 55
Discharge: HOME | End: 2020-02-27
Payer: COMMERCIAL

## 2020-02-25 PROCEDURE — 77063 BREAST TOMOSYNTHESIS BI: CPT

## 2020-07-23 ENCOUNTER — OFFICE VISIT (OUTPATIENT)
Dept: PRIMARY CARE CLINIC | Age: 55
End: 2020-07-23
Payer: COMMERCIAL

## 2020-07-23 ENCOUNTER — HOSPITAL ENCOUNTER (OUTPATIENT)
Age: 55
Setting detail: SPECIMEN
Discharge: HOME | End: 2020-07-23
Payer: COMMERCIAL

## 2020-07-23 VITALS
HEART RATE: 60 BPM | SYSTOLIC BLOOD PRESSURE: 136 MMHG | DIASTOLIC BLOOD PRESSURE: 82 MMHG | OXYGEN SATURATION: 96 % | BODY MASS INDEX: 37.57 KG/M2 | WEIGHT: 199 LBS | HEIGHT: 61 IN

## 2020-07-23 PROBLEM — F17.210 CIGARETTE NICOTINE DEPENDENCE WITHOUT COMPLICATION: Status: ACTIVE | Noted: 2020-07-23

## 2020-07-23 PROBLEM — Z71.6 ENCOUNTER FOR TOBACCO USE CESSATION COUNSELING: Status: ACTIVE | Noted: 2020-07-23

## 2020-07-23 PROBLEM — R87.811 VAGINAL HIGH RISK HUMAN PAPILLOMAVIRUS (HPV) DNA TEST POSITIVE: Status: ACTIVE | Noted: 2020-07-23

## 2020-07-23 PROBLEM — E65 CENTRAL OBESITY: Status: ACTIVE | Noted: 2020-07-23

## 2020-07-23 PROBLEM — E87.6 HYPOKALEMIA: Status: ACTIVE | Noted: 2020-07-23

## 2020-07-23 PROBLEM — N89.3 DYSPLASIA OF VAGINA: Status: ACTIVE | Noted: 2020-07-23

## 2020-07-23 LAB
ABSOLUTE EOS #: 0.24 K/UL (ref 0–0.44)
ABSOLUTE IMMATURE GRANULOCYTE: <0.03 K/UL (ref 0–0.3)
ABSOLUTE LYMPH #: 3.22 K/UL (ref 1.1–3.7)
ABSOLUTE MONO #: 0.83 K/UL (ref 0.1–1.2)
ANION GAP SERPL CALCULATED.3IONS-SCNC: 13 MMOL/L (ref 9–17)
BASOPHILS # BLD: 1 % (ref 0–2)
BASOPHILS ABSOLUTE: 0.06 K/UL (ref 0–0.2)
BUN BLDV-MCNC: 17 MG/DL (ref 6–20)
BUN/CREAT BLD: ABNORMAL (ref 9–20)
CALCIUM SERPL-MCNC: 8.9 MG/DL (ref 8.6–10.4)
CHLORIDE BLD-SCNC: 105 MMOL/L (ref 98–107)
CO2: 24 MMOL/L (ref 20–31)
CREAT SERPL-MCNC: 0.49 MG/DL (ref 0.5–0.9)
DIFFERENTIAL TYPE: NORMAL
EOSINOPHILS RELATIVE PERCENT: 3 % (ref 1–4)
GFR AFRICAN AMERICAN: >60 ML/MIN
GFR NON-AFRICAN AMERICAN: >60 ML/MIN
GFR SERPL CREATININE-BSD FRML MDRD: ABNORMAL ML/MIN/{1.73_M2}
GFR SERPL CREATININE-BSD FRML MDRD: ABNORMAL ML/MIN/{1.73_M2}
GLUCOSE BLD-MCNC: 92 MG/DL (ref 70–99)
HCT VFR BLD CALC: 42.4 % (ref 36.3–47.1)
HEMOGLOBIN: 13.1 G/DL (ref 11.9–15.1)
IMMATURE GRANULOCYTES: 0 %
LYMPHOCYTES # BLD: 38 % (ref 24–43)
MCH RBC QN AUTO: 28.1 PG (ref 25.2–33.5)
MCHC RBC AUTO-ENTMCNC: 30.9 G/DL (ref 28.4–34.8)
MCV RBC AUTO: 91 FL (ref 82.6–102.9)
MONOCYTES # BLD: 10 % (ref 3–12)
NRBC AUTOMATED: 0 PER 100 WBC
PDW BLD-RTO: 13.2 % (ref 11.8–14.4)
PLATELET # BLD: 305 K/UL (ref 138–453)
PLATELET ESTIMATE: NORMAL
PMV BLD AUTO: 10.8 FL (ref 8.1–13.5)
POTASSIUM SERPL-SCNC: 4.1 MMOL/L (ref 3.7–5.3)
RBC # BLD: 4.66 M/UL (ref 3.95–5.11)
RBC # BLD: NORMAL 10*6/UL
SEG NEUTROPHILS: 48 % (ref 36–65)
SEGMENTED NEUTROPHILS ABSOLUTE COUNT: 4.19 K/UL (ref 1.5–8.1)
SODIUM BLD-SCNC: 142 MMOL/L (ref 135–144)
THYROXINE, FREE: 1.21 NG/DL (ref 0.93–1.7)
TSH SERPL DL<=0.05 MIU/L-ACNC: 2.76 MIU/L (ref 0.3–5)
WBC # BLD: 8.6 K/UL (ref 3.5–11.3)
WBC # BLD: NORMAL 10*3/UL

## 2020-07-23 PROCEDURE — G0444 DEPRESSION SCREEN ANNUAL: HCPCS | Performed by: PHYSICIAN ASSISTANT

## 2020-07-23 PROCEDURE — 99213 OFFICE O/P EST LOW 20 MIN: CPT | Performed by: PHYSICIAN ASSISTANT

## 2020-07-23 RX ORDER — NIFEDIPINE 60 MG/1
TABLET, FILM COATED, EXTENDED RELEASE ORAL
Qty: 30 TABLET | Refills: 5 | Status: SHIPPED | OUTPATIENT
Start: 2020-07-23 | End: 2021-02-02 | Stop reason: SDUPTHER

## 2020-07-23 RX ORDER — BUPROPION HYDROCHLORIDE 150 MG/1
150 TABLET ORAL EVERY MORNING
Qty: 30 TABLET | Refills: 1 | Status: SHIPPED | OUTPATIENT
Start: 2020-07-23 | End: 2021-02-02

## 2020-07-23 RX ORDER — NICOTINE 21 MG/24HR
1 PATCH, TRANSDERMAL 24 HOURS TRANSDERMAL DAILY
Qty: 30 PATCH | Refills: 0 | Status: SHIPPED | OUTPATIENT
Start: 2020-07-23 | End: 2022-02-07

## 2020-07-23 RX ORDER — ATENOLOL 50 MG/1
TABLET ORAL
Qty: 30 TABLET | Refills: 5 | Status: SHIPPED | OUTPATIENT
Start: 2020-07-23 | End: 2021-01-25 | Stop reason: SDUPTHER

## 2020-07-23 ASSESSMENT — PATIENT HEALTH QUESTIONNAIRE - PHQ9
SUM OF ALL RESPONSES TO PHQ QUESTIONS 1-9: 7
2. FEELING DOWN, DEPRESSED OR HOPELESS: 1
SUM OF ALL RESPONSES TO PHQ9 QUESTIONS 1 & 2: 3
1. LITTLE INTEREST OR PLEASURE IN DOING THINGS: 2
5. POOR APPETITE OR OVEREATING: 1
4. FEELING TIRED OR HAVING LITTLE ENERGY: 3
8. MOVING OR SPEAKING SO SLOWLY THAT OTHER PEOPLE COULD HAVE NOTICED. OR THE OPPOSITE, BEING SO FIGETY OR RESTLESS THAT YOU HAVE BEEN MOVING AROUND A LOT MORE THAN USUAL: 0
7. TROUBLE CONCENTRATING ON THINGS, SUCH AS READING THE NEWSPAPER OR WATCHING TELEVISION: 0
3. TROUBLE FALLING OR STAYING ASLEEP: 0
SUM OF ALL RESPONSES TO PHQ QUESTIONS 1-9: 7
6. FEELING BAD ABOUT YOURSELF - OR THAT YOU ARE A FAILURE OR HAVE LET YOURSELF OR YOUR FAMILY DOWN: 0
9. THOUGHTS THAT YOU WOULD BE BETTER OFF DEAD, OR OF HURTING YOURSELF: 0

## 2020-07-23 ASSESSMENT — ENCOUNTER SYMPTOMS
ABDOMINAL PAIN: 0
SHORTNESS OF BREATH: 0
APNEA: 0
COLOR CHANGE: 0
COUGH: 0
CHEST TIGHTNESS: 0

## 2020-07-23 NOTE — PROGRESS NOTES
[address] [place] [address] PRIMARY CARE  [address] [place] [id number]  Dept: [place] Candelaria Lowe is a 47 y.o. female who presents today for her medical conditions/complaintsas noted below. Chief Complaint   Patient presents with    Hypertension     Patient does not check BP.  Nicotine Dependence     Patient would like to quit smoking. HPI:     HPI   BPs have been ok. NO CP, SOB, dizziness, palps. Feels like she has no energy. Lack of motivation. Pandemic has affected her. Smoking a lot lately. Ready to quit. LDL Cholesterol (mg/dL)   Date Value   2019 136 (H)   2018 106   10/20/2017 119       (goal LDL is <100)   AST (U/L)   Date Value   2019 25     ALT (U/L)   Date Value   2019 29     BUN (mg/dL)   Date Value   2019 14     BP Readings from Last 3 Encounters:   20 136/82   19 138/82   19 120/80          (goal 120/80)    Past Medical History:   Diagnosis Date    Hypertension       Past Surgical History:   Procedure Laterality Date    ADRENAL GLAND SURGERY Left 2013     SECTION      HYSTERECTOMY  2010    with family hx of ovarian cancer    TONSILLECTOMY         Family History   Problem Relation Age of Onset    Diabetes Father     Diabetes Paternal Grandmother     Ovarian Cancer Mother     Diabetes Paternal Cousin         2    Colon Cancer Brother     Breast Cancer Maternal Aunt     Thyroid Cancer Other     Other Other         retinoblastoma    Other Son         suicide       Social History     Tobacco Use    Smoking status: Current Every Day Smoker     Packs/day: 0.50     Years: 20.00     Pack years: 10.00     Types: Cigarettes    Smokeless tobacco: Never Used   Substance Use Topics    Alcohol use:  Yes     Alcohol/week: 0.0 standard drinks     Comment: socially      Current Outpatient Medications   Medication Sig Dispense Refill    atenolol (TENORMIN) 50 MG tablet TAKE 1 TABLET BY MOUTH ONE TIME A DAY 30 tablet 5    NIFEdipine (ADALAT CC) 60 MG extended release tablet TAKE 1 TABLET BY MOUTH ONE TIME A DAY 30 tablet 5    buPROPion (WELLBUTRIN XL) 150 MG extended release tablet Take 1 tablet by mouth every morning 30 tablet 1    nicotine (NICODERM CQ) 21 MG/24HR Place 1 patch onto the skin daily 30 patch 0    nicotine (NICODERM CQ) 14 MG/24HR Place 1 patch onto the skin daily 30 patch 0    nicotine (NICODERM CQ) 7 MG/24HR Place 1 patch onto the skin daily 30 patch 0    estradiol (ESTRACE) 1 MG tablet TAKE 1 TABLET BY MOUTH ONE TIME A DAY  21 tablet 3    chlorhexidine (PERIDEX) 0.12 % solution RINSE WITH 1/2 OUNCE BY MOUTH TWICE DAILY AFTER BREAKFAST AND BEFORE BEDTIME  1     No current facility-administered medications for this visit. Allergies   Allergen Reactions    No Known Allergies        Health Maintenance   Topic Date Due    Shingles Vaccine (1 of 2) 09/21/2015    Cervical cancer screen  10/09/2017    Flu vaccine (1) 09/01/2020    Colon cancer screen colonoscopy  03/17/2021    Breast cancer screen  02/25/2022    Lipid screen  05/28/2024    DTaP/Tdap/Td vaccine (2 - Td) 06/11/2025    Pneumococcal 0-64 years Vaccine  Completed    Hepatitis C screen  Completed    HIV screen  Completed    Hepatitis A vaccine  Aged Out    Hepatitis B vaccine  Aged Out    Hib vaccine  Aged Out    Meningococcal (ACWY) vaccine  Aged Out       Subjective:      Review of Systems   Constitutional: Negative for fatigue. Respiratory: Negative for apnea, cough, chest tightness and shortness of breath. Cardiovascular: Negative for chest pain, palpitations and leg swelling. Gastrointestinal: Negative for abdominal pain. Skin: Negative for color change. Neurological: Negative for dizziness, syncope, weakness, light-headedness and headaches. Psychiatric/Behavioral: Positive for dysphoric mood (lack of energy and motivation). Negative for sleep disturbance.  The patient is not nervous/anxious. Objective:     /82   Pulse 60   Ht 5' 1\" (1.549 m)   Wt 199 lb (90.3 kg)   SpO2 96%   BMI 37.60 kg/m²   Physical Exam  Vitals signs reviewed. Neck:      Musculoskeletal: No edema. Vascular: No carotid bruit. Cardiovascular:      Rate and Rhythm: Normal rate and regular rhythm. Heart sounds: Normal heart sounds. No murmur. No friction rub. No gallop. Pulmonary:      Effort: Pulmonary effort is normal.      Breath sounds: Normal breath sounds. Neurological:      Mental Status: She is alert and oriented to person, place, and time. Assessment:       Diagnosis Orders   1. Essential hypertension  atenolol (TENORMIN) 50 MG tablet    NIFEdipine (ADALAT CC) 60 MG extended release tablet    Basic Metabolic Panel   2. Encounter for tobacco use cessation counseling     3. Cigarette nicotine dependence without complication     4. Fatigue, unspecified type  CBC Auto Differential    TSH without Reflex    T4, Free        Plan:    Refillls sent  Discussed quitting smoking and meds     Return in about 6 months (around 1/23/2021) for HTN.     Orders Placed This Encounter   Procedures    CBC Auto Differential     Standing Status:   Future     Standing Expiration Date:   7/23/2021    TSH without Reflex     Standing Status:   Future     Standing Expiration Date:   7/23/2021    T4, Free     Standing Status:   Future     Standing Expiration Date:   7/23/2021    Basic Metabolic Panel     Standing Status:   Future     Standing Expiration Date:   7/23/2021     Orders Placed This Encounter   Medications    atenolol (TENORMIN) 50 MG tablet     Sig: TAKE 1 TABLET BY MOUTH ONE TIME A DAY     Dispense:  30 tablet     Refill:  5    NIFEdipine (ADALAT CC) 60 MG extended release tablet     Sig: TAKE 1 TABLET BY MOUTH ONE TIME A DAY     Dispense:  30 tablet     Refill:  5    buPROPion (WELLBUTRIN XL) 150 MG extended release tablet     Sig: Take 1 tablet by mouth every morning Dispense:  30 tablet     Refill:  1    nicotine (NICODERM CQ) 21 MG/24HR     Sig: Place 1 patch onto the skin daily     Dispense:  30 patch     Refill:  0    nicotine (NICODERM CQ) 14 MG/24HR     Sig: Place 1 patch onto the skin daily     Dispense:  30 patch     Refill:  0    nicotine (NICODERM CQ) 7 MG/24HR     Sig: Place 1 patch onto the skin daily     Dispense:  30 patch     Refill:  0       Patient given educationalmaterials - see patient instructions. Discussed use, benefit, and side effectsof prescribed medications. All patient questions answered. Pt voiced understanding. Reviewed health maintenance. Instructed to continue current medications, diet andexercise. Patient agreed with treatment plan. Follow up as directed.      Electronicallysigned by Artemio Sainz PA-C on 7/23/2020 at 8:58 AM

## 2020-07-23 NOTE — PATIENT INSTRUCTIONS
Patient Education        Learning About Benefits From Quitting Smoking  How does quitting smoking make you healthier? If you're thinking about quitting smoking, you may have a few reasons to be smoke-free. Your health may be one of them. · When you quit smoking, you lower your risks for cancer, lung disease, heart attack, stroke, blood vessel disease, and blindness from macular degeneration. · When you're smoke-free, you get sick less often, and you heal faster. You are less likely to get colds, flu, bronchitis, and pneumonia. · As a nonsmoker, you may find that your mood is better and you are less stressed. When and how will you feel healthier? Quitting has real health benefits that start from day 1 of being smoke-free. And the longer you stay smoke-free, the healthier you get and the better you feel. The first hours  · After just 20 minutes, your blood pressure and heart rate go down. That means there's less stress on your heart and blood vessels. · Within 12 hours, the level of carbon monoxide in your blood drops back to normal. That makes room for more oxygen. With more oxygen in your body, you may notice that you have more energy than when you smoked. After 2 weeks  · Your lungs start to work better. · Your risk of heart attack starts to drop. After 1 month  · When your lungs are clear, you cough less and breathe deeper, so it's easier to be active. · Your sense of taste and smell return. That means you can enjoy food more than you have since you started smoking. Over the years  · Over the years, your risks of heart disease, heart attack, and stroke are lower. · After 10 years, your risk of dying from lung cancer is cut by about half. And your risk for many other types of cancer is lower too. How would quitting help others in your life? When you quit smoking, you improve the health of everyone who now breathes in your smoke.   · Their heart, lung, and cancer risks drop, much like yours.  · They are sick less. For babies and small children, living smoke-free means they're less likely to have ear infections, pneumonia, and bronchitis. · If you're a woman who is or will be pregnant someday, quitting smoking means a healthier . · Children who are close to you are less likely to become adult smokers. Where can you learn more? Go to https://[employer]. org and sign in to your K[employer] account. Enter [phone] in the Tech Cocktail box to learn more about \"Learning About Benefits From Quitting Smoking. \"     If you do not have an account, please click on the \"Sign Up Now\" link. Current as of: 2020               Content Version: 12. [employer]. Care instructions adapted under license by [employer] ([facility]). If you have questions about a medical condition or this instruction, always ask your healthcare professional. [address] any warranty or liability for your use of this information. Patient Education         Quitting Smoking: Medicines to Help With Cravings (00:40)  Your health professional recommends that you watch this short online health video. Learn about products and medicines that can take the edge off nicotine cravings. How to watch the video    Scan the QR code   OR Visit the website    https://[employer]. se/r/Gtzaqqvxxzyhz   Current as of: 2020               Content Version: 12.5   [employer]. Care instructions adapted under license by [employer] ([facility]). If you have questions about a medical condition or this instruction, always ask your healthcare professional. [address] any warranty or liability for your use of this information.

## 2020-08-12 ENCOUNTER — TELEPHONE (OUTPATIENT)
Dept: PRIMARY CARE CLINIC | Age: 55
End: 2020-08-12

## 2020-08-12 NOTE — TELEPHONE ENCOUNTER
Left message for patient offering the Shingrix vaccine. Patient can add it to her visit with Betsy Israel on 08/25/2020 if she wishes. Please add to appointment notes and let Nereyda Bob know that the patient has scheduled. This claim will be run through her commercial insurance.

## 2020-08-18 ENCOUNTER — TELEPHONE (OUTPATIENT)
Dept: PRIMARY CARE CLINIC | Age: 55
End: 2020-08-18

## 2020-08-18 NOTE — TELEPHONE ENCOUNTER
Pt calling and states that she would like to have the Shingrix at her [address]. She had to cancel her NOV due to work and will call back to schedule.

## 2020-08-18 NOTE — TELEPHONE ENCOUNTER
Left message again for patient offering the Shingrix vaccine. Patient can add it to her visit with Dionicio Arias on 08/25/2020 if she wishes. Please add to appointment notes and let David Garcia know that the patient has scheduled. This claim will be run through her commercial insurance.

## 2020-10-29 ENCOUNTER — TELEPHONE (OUTPATIENT)
Dept: PRIMARY CARE CLINIC | Age: 55
End: 2020-10-29

## 2020-10-29 NOTE — TELEPHONE ENCOUNTER
Left message for patient to schedule her Shingirx Vaccine. Please let Mello Oneal know when patient returns the call and/or schedules.   INSURANCE

## 2021-01-25 DIAGNOSIS — I10 ESSENTIAL HYPERTENSION: ICD-10-CM

## 2021-01-25 RX ORDER — ATENOLOL 50 MG/1
TABLET ORAL
Qty: 30 TABLET | Refills: 0 | Status: SHIPPED | OUTPATIENT
Start: 2021-01-25 | End: 2021-02-02 | Stop reason: ALTCHOICE

## 2021-02-02 ENCOUNTER — OFFICE VISIT (OUTPATIENT)
Dept: PRIMARY CARE CLINIC | Age: 56
End: 2021-02-02
Payer: COMMERCIAL

## 2021-02-02 VITALS
TEMPERATURE: 97 F | HEIGHT: 61 IN | DIASTOLIC BLOOD PRESSURE: 84 MMHG | WEIGHT: 203 LBS | HEART RATE: 86 BPM | OXYGEN SATURATION: 97 % | SYSTOLIC BLOOD PRESSURE: 134 MMHG | BODY MASS INDEX: 38.33 KG/M2

## 2021-02-02 DIAGNOSIS — Z23 NEED FOR VACCINATION: Primary | ICD-10-CM

## 2021-02-02 DIAGNOSIS — E78.2 MIXED HYPERLIPIDEMIA: ICD-10-CM

## 2021-02-02 DIAGNOSIS — F17.210 CIGARETTE NICOTINE DEPENDENCE WITHOUT COMPLICATION: ICD-10-CM

## 2021-02-02 DIAGNOSIS — I10 ESSENTIAL HYPERTENSION: ICD-10-CM

## 2021-02-02 PROBLEM — E87.6 HYPOKALEMIA: Status: RESOLVED | Noted: 2020-07-23 | Resolved: 2021-02-02

## 2021-02-02 PROCEDURE — 99214 OFFICE O/P EST MOD 30 MIN: CPT | Performed by: PHYSICIAN ASSISTANT

## 2021-02-02 PROCEDURE — 90471 IMMUNIZATION ADMIN: CPT | Performed by: PHYSICIAN ASSISTANT

## 2021-02-02 PROCEDURE — 90750 HZV VACC RECOMBINANT IM: CPT | Performed by: PHYSICIAN ASSISTANT

## 2021-02-02 RX ORDER — ATENOLOL 50 MG/1
TABLET ORAL
Qty: 30 TABLET | Refills: 0 | Status: CANCELLED | OUTPATIENT
Start: 2021-02-02

## 2021-02-02 RX ORDER — NIFEDIPINE 60 MG/1
TABLET, FILM COATED, EXTENDED RELEASE ORAL
Qty: 90 TABLET | Refills: 1 | Status: SHIPPED | OUTPATIENT
Start: 2021-02-02 | End: 2021-02-07

## 2021-02-02 RX ORDER — LISINOPRIL 10 MG/1
10 TABLET ORAL DAILY
Qty: 90 TABLET | Refills: 1 | Status: SHIPPED | OUTPATIENT
Start: 2021-02-02 | End: 2021-07-19

## 2021-02-02 ASSESSMENT — ENCOUNTER SYMPTOMS
SHORTNESS OF BREATH: 0
COLOR CHANGE: 0
COUGH: 0
CHEST TIGHTNESS: 0
APNEA: 0
ABDOMINAL PAIN: 0

## 2021-02-02 ASSESSMENT — PATIENT HEALTH QUESTIONNAIRE - PHQ9
SUM OF ALL RESPONSES TO PHQ QUESTIONS 1-9: 0
2. FEELING DOWN, DEPRESSED OR HOPELESS: 0

## 2021-02-02 NOTE — PROGRESS NOTES
[address] [place] [address] PRIMARY CARE  [address] [place] [id number]  Dept: [phone]    Gilberto Hendrix is a 54 y.o. female who presents today for her medical conditions/complaintsas noted below. Chief Complaint   Patient presents with    Hypertension     Patient does not check BP.        HPI:     HPI  No CP, SOB, dizziness, palps, syncope. No regular exercise  Still smoking  REviewed last BW  Last pap: Dr. Theodora Marquez next week  LDL Cholesterol (mg/dL)   Date Value   2019 136 (H)   2018 106   10/20/2017 119       (goal LDL is <100)   AST (U/L)   Date Value   2019 25     ALT (U/L)   Date Value   2019 29     BUN (mg/dL)   Date Value   2020 17     BP Readings from Last 3 Encounters:   21 134/84   20 136/82   19 138/82          (goal 120/80)    Past Medical History:   Diagnosis Date    Hypertension       Past Surgical History:   Procedure Laterality Date    ADRENAL GLAND SURGERY Left 2013     SECTION      HYSTERECTOMY  2010    with family hx of ovarian cancer    TONSILLECTOMY         Family History   Problem Relation Age of Onset    Diabetes Father     Diabetes Paternal Grandmother     Ovarian Cancer Mother     Diabetes Paternal Cousin         2    Colon Cancer Brother     Breast Cancer Maternal Aunt     Thyroid Cancer Other     Other Other         retinoblastoma    Other Son         suicide       Social History     Tobacco Use    Smoking status: Current Every Day Smoker     Packs/day: 0.50     Years: 20.00     Pack years: 10.00     Types: Cigarettes    Smokeless tobacco: Never Used   Substance Use Topics    Alcohol use:  Yes     Alcohol/week: 0.0 standard drinks     Comment: socially      Current Outpatient Medications   Medication Sig Dispense Refill    NIFEdipine (ADALAT CC) 60 MG extended release tablet TAKE 1 TABLET BY MOUTH ONE TIME A DAY 90 tablet 1  lisinopril (PRINIVIL;ZESTRIL) 10 MG tablet Take 1 tablet by mouth daily 90 tablet 1    estradiol (ESTRACE) 1 MG tablet TAKE 1 TABLET BY MOUTH ONE TIME A DAY  21 tablet 3    nicotine (NICODERM CQ) 21 MG/24HR Place 1 patch onto the skin daily 30 patch 0    nicotine (NICODERM CQ) 14 MG/24HR Place 1 patch onto the skin daily 30 patch 0    nicotine (NICODERM CQ) 7 MG/24HR Place 1 patch onto the skin daily 30 patch 0    chlorhexidine (PERIDEX) 0.12 % solution RINSE WITH 1/2 OUNCE BY MOUTH TWICE DAILY AFTER BREAKFAST AND BEFORE BEDTIME  1     No current facility-administered medications for this visit. Allergies   Allergen Reactions    No Known Allergies        Health Maintenance   Topic Date Due    Shingles Vaccine (1 of 2) 09/21/2015    Cervical cancer screen  10/09/2017    Flu vaccine (1) 02/02/2022 (Originally 9/1/2020)    Colon cancer screen colonoscopy  03/17/2021    Breast cancer screen  02/25/2022    Lipid screen  05/28/2024    DTaP/Tdap/Td vaccine (2 - Td) 06/11/2025    Pneumococcal 0-64 years Vaccine  Completed    Hepatitis C screen  Completed    HIV screen  Completed    Hepatitis A vaccine  Aged Out    Hepatitis B vaccine  Aged Out    Hib vaccine  Aged Out    Meningococcal (ACWY) vaccine  Aged Out       Subjective:      Review of Systems   Constitutional: Negative for fatigue. Respiratory: Negative for apnea, cough, chest tightness and shortness of breath. Cardiovascular: Negative for chest pain, palpitations and leg swelling. Gastrointestinal: Negative for abdominal pain. Skin: Negative for color change. Neurological: Negative for dizziness, syncope, weakness, light-headedness and headaches. Psychiatric/Behavioral: Negative for sleep disturbance. The patient is not nervous/anxious. Objective:     /84   Pulse 86   Temp 97 °F (36.1 °C)   Ht 5' 1\" (1.549 m)   Wt 203 lb (92.1 kg)   SpO2 97%   BMI 38.36 kg/m²   Physical Exam  Vitals signs reviewed.    Neck: Musculoskeletal: No edema. Vascular: No carotid bruit. Cardiovascular:      Rate and Rhythm: Normal rate and regular rhythm. Heart sounds: Normal heart sounds. No murmur. No friction rub. No gallop. Pulmonary:      Effort: Pulmonary effort is normal.      Breath sounds: Normal breath sounds. Neurological:      Mental Status: She is alert and oriented to person, place, and time. Assessment:       Diagnosis Orders   1. Essential hypertension  Lipid Panel    ALT    AST    NIFEdipine (ADALAT CC) 60 MG extended release tablet   2. Mixed hyperlipidemia  Lipid Panel    ALT    AST   3. Cigarette nicotine dependence without complication          Plan:    Changed Atenolol to Lisinopril  Work to wean of nifidipine NV  Shingrix today  BW ordered  Discussed smoking cessation  Work on [employer] and regular exercise. Well woman will be scheduled here. Return in about 3 months (around 5/2/2021) for HTN, Well woman.     Orders Placed This Encounter   Procedures    Lipid Panel     Standing Status:   Future     Standing Expiration Date:   2/2/2022     Order Specific Question:   Is Patient Fasting?/# of Hours     Answer:   10-12 hours    ALT     Standing Status:   Future     Standing Expiration Date:   2/2/2022    AST     Standing Status:   Future     Standing Expiration Date:   2/2/2022     Orders Placed This Encounter   Medications    NIFEdipine (ADALAT CC) 60 MG extended release tablet     Sig: TAKE 1 TABLET BY MOUTH ONE TIME A DAY     Dispense:  90 tablet     Refill:  1    lisinopril (PRINIVIL;ZESTRIL) 10 MG tablet     Sig: Take 1 tablet by mouth daily     Dispense:  90 tablet     Refill:  1 Patient given educationalmaterials - see patient instructions. Discussed use, benefit, and side effectsof prescribed medications. All patient questions answered. Pt voiced understanding. Reviewed health maintenance. Instructed to continue current medications, diet andexercise. Patient agreed with treatment plan. Follow up as directed.      Electronicallysigned by Williams Baldwin PA-C on 2/2/2021 at 8:18 AM

## 2021-02-07 LAB
ALT SERPL-CCNC: 15 U/L (ref 0–31)
AST SERPL-CCNC: 17 U/L (ref 0–41)
CHOLESTEROL/HDL RATIO: 4.6 (ref 1–5)
CHOLESTEROL: 207 MG/DL (ref 150–200)
HDLC SERPL-MCNC: 45 MG/DL
LDL CHOLESTEROL CALCULATED: 118 MG/DL
LDL/HDL RATIO: 2.6
TRIGL SERPL-MCNC: 220 MG/DL (ref 27–150)
VLDLC SERPL CALC-MCNC: 44 MG/DL (ref 0–30)

## 2021-03-11 ENCOUNTER — HOSPITAL ENCOUNTER (OUTPATIENT)
Dept: WOMENS IMAGING | Age: 56
Discharge: HOME | End: 2021-03-13
Payer: COMMERCIAL

## 2021-03-11 DIAGNOSIS — Z12.31 VISIT FOR SCREENING MAMMOGRAM: ICD-10-CM

## 2021-03-11 PROCEDURE — 77063 BREAST TOMOSYNTHESIS BI: CPT

## 2021-07-14 ENCOUNTER — NURSE ONLY (OUTPATIENT)
Dept: PRIMARY CARE CLINIC | Age: 56
End: 2021-07-14
Payer: COMMERCIAL

## 2021-07-14 DIAGNOSIS — Z23 NEED FOR VACCINATION: Primary | ICD-10-CM

## 2021-07-14 PROCEDURE — 90471 IMMUNIZATION ADMIN: CPT | Performed by: PHYSICIAN ASSISTANT

## 2021-07-14 PROCEDURE — 90750 HZV VACC RECOMBINANT IM: CPT | Performed by: PHYSICIAN ASSISTANT

## 2021-07-14 NOTE — PROGRESS NOTES
After obtaining consent, and per orders of Amanda Julien PA-C, injection of Shingrix given in Left deltoid by Sudha Cantu MA. Patient instructed to remain in clinic for 20 minutes afterwards, and to report any adverse reaction to the office immediately.

## 2021-09-21 ENCOUNTER — OFFICE VISIT (OUTPATIENT)
Dept: PRIMARY CARE CLINIC | Age: 56
End: 2021-09-21
Payer: COMMERCIAL

## 2021-09-21 VITALS
WEIGHT: 198 LBS | DIASTOLIC BLOOD PRESSURE: 82 MMHG | BODY MASS INDEX: 37.38 KG/M2 | SYSTOLIC BLOOD PRESSURE: 136 MMHG | HEART RATE: 79 BPM | OXYGEN SATURATION: 98 % | HEIGHT: 61 IN

## 2021-09-21 DIAGNOSIS — R53.83 FATIGUE, UNSPECIFIED TYPE: ICD-10-CM

## 2021-09-21 DIAGNOSIS — M25.551 RIGHT HIP PAIN: ICD-10-CM

## 2021-09-21 DIAGNOSIS — I10 ESSENTIAL HYPERTENSION: Primary | ICD-10-CM

## 2021-09-21 PROCEDURE — 99214 OFFICE O/P EST MOD 30 MIN: CPT | Performed by: PHYSICIAN ASSISTANT

## 2021-09-21 RX ORDER — LISINOPRIL 10 MG/1
TABLET ORAL
Qty: 90 TABLET | Refills: 1 | Status: SHIPPED | OUTPATIENT
Start: 2021-09-21 | End: 2022-01-14 | Stop reason: SDUPTHER

## 2021-09-21 RX ORDER — CELECOXIB 100 MG/1
100 CAPSULE ORAL 2 TIMES DAILY
Qty: 60 CAPSULE | Refills: 0 | Status: SHIPPED | OUTPATIENT
Start: 2021-09-21 | End: 2021-11-02 | Stop reason: SDUPTHER

## 2021-09-21 RX ORDER — NIFEDIPINE 60 MG/1
TABLET, FILM COATED, EXTENDED RELEASE ORAL
Qty: 90 TABLET | Refills: 0 | Status: SHIPPED | OUTPATIENT
Start: 2021-09-21 | End: 2022-01-06 | Stop reason: SDUPTHER

## 2021-09-21 ASSESSMENT — ENCOUNTER SYMPTOMS
APNEA: 0
COUGH: 0
ABDOMINAL PAIN: 0
SHORTNESS OF BREATH: 0
COLOR CHANGE: 0
CHEST TIGHTNESS: 0

## 2021-09-21 NOTE — PROGRESS NOTES
[facility] PRIMARY CARE  [address] [place] [address] B  [address] [address]  Dept: [phone]    Martina Woods is a 64 y.o. female who presents today for her medical conditions/complaints as noted below. Chief Complaint   Patient presents with    Leg Pain     right leg pain x1 month. No fall or injury       HPI:     HPI  HTN:  No CP, SOB, dizziness, palps, syncope    Right leg: No remembered injury. Bothers her after sitting and laying in bed, not when she is active at work. Pain in groin and thigh. LDL Cholesterol (mg/dL)   Date Value   2019 136 (H)   2018 106   10/20/2017 119     LDL Calculated (mg/dL)   Date Value   2021 118       (goal LDL is <100)   AST (U/L)   Date Value   2021 17     ALT (U/L)   Date Value   2021 15     BUN (mg/dL)   Date Value   2020 17     BP Readings from Last 3 Encounters:   21 136/82   21 134/84   20 136/82          (goal 120/80)    Past Medical History:   Diagnosis Date    Hypertension       Past Surgical History:   Procedure Laterality Date    ADRENAL GLAND SURGERY Left 2013     SECTION      HYSTERECTOMY      with family hx of ovarian cancer    TONSILLECTOMY         Family History   Problem Relation Age of Onset    Diabetes Father     Diabetes Paternal Grandmother     Ovarian Cancer Mother     Diabetes Paternal Cousin         2    Colon Cancer Brother     Breast Cancer Maternal Aunt     Thyroid Cancer Other     Other Other         retinoblastoma    Other Son         suicide       Social History     Tobacco Use    Smoking status: Current Every Day Smoker     Packs/day: 0.50     Years: 20.00     Pack years: 10.00     Types: Cigarettes    Smokeless tobacco: Never Used   Substance Use Topics    Alcohol use:  Yes     Alcohol/week: 0.0 standard drinks     Comment: socially      Current Outpatient Medications   Medication Sig Dispense Refill    NIFEdipine (ADALAT CC) 60 MG for abdominal pain. Musculoskeletal: Positive for arthralgias (right hip) and myalgias (right leg pain). Skin: Negative for color change. Neurological: Negative for dizziness, syncope, weakness, light-headedness and headaches. Psychiatric/Behavioral: Negative for sleep disturbance. The patient is not nervous/anxious. Objective:     /82   Pulse 79   Ht 5' 1\" (1.549 m)   Wt 198 lb (89.8 kg)   SpO2 98%   BMI 37.41 kg/m²   Physical Exam  Vitals reviewed. Neck:      Vascular: No carotid bruit. Cardiovascular:      Rate and Rhythm: Normal rate and regular rhythm. Heart sounds: Normal heart sounds. No murmur heard. No friction rub. No gallop. Pulmonary:      Effort: Pulmonary effort is normal.      Breath sounds: Normal breath sounds. Musculoskeletal:      Cervical back: No edema. Neurological:      Mental Status: She is alert and oriented to person, place, and time. Assessment:       Diagnosis Orders   1. Essential hypertension  NIFEdipine (ADALAT CC) 60 MG extended release tablet    lisinopril (PRINIVIL;ZESTRIL) 10 MG tablet   2. Right hip pain  XR HIP RIGHT (2-3 VIEWS)    External Referral To Physical Therapy   3. Fatigue, unspecified type  Comprehensive Metabolic Panel, Fasting    CBC Auto Differential    TSH without Reflex    T4, Free    Vitamin B12    Vitamin D 25 Hydroxy        Plan:    Refills sent  BW ordered  X-ray of hip  PT for hip  Celebrex for hip    Return in about 6 weeks (around 11/2/2021) for Lab Results and hip pain.     Orders Placed This Encounter   Procedures    XR HIP RIGHT (2-3 VIEWS)     Standing Status:   Future     Standing Expiration Date:   9/21/2022    Comprehensive Metabolic Panel, Fasting     Standing Status:   Future     Standing Expiration Date:   9/21/2022    CBC Auto Differential     Standing Status:   Future     Standing Expiration Date:   9/21/2022    TSH without Reflex     Standing Status:   Future     Standing Expiration Date: 9/21/2022    T4, Free     Standing Status:   Future     Standing Expiration Date:   9/21/2022    Vitamin B12     Standing Status:   Future     Standing Expiration Date:   9/21/2022    Vitamin D 25 Hydroxy     Standing Status:   Future     Standing Expiration Date:   9/21/2022    External Referral To Physical Therapy     Referral Priority:   Routine     Referral Type:   Eval and Treat     Referral Reason:   Specialty Services Required     Requested Specialty:   Physical Therapy     Number of Visits Requested:   1     Orders Placed This Encounter   Medications    NIFEdipine (ADALAT CC) 60 MG extended release tablet     Sig: TAKE 1 TABLET DAILY     Dispense:  90 tablet     Refill:  0    lisinopril (PRINIVIL;ZESTRIL) 10 MG tablet     Sig: TAKE 1 TABLET DAILY     Dispense:  90 tablet     Refill:  1    celecoxib (CELEBREX) 100 MG capsule     Sig: Take 1 capsule by mouth 2 times daily     Dispense:  60 capsule     Refill:  0       Patient given educational materials - see patient instructions. Discussed use, benefit, and side effects of prescribed medications. All patient questions answered. Pt voiced understanding. Reviewed health maintenance. Instructed to continue current medications, diet and exercise. Patient agreed with treatment plan. Follow up as directed.      Electronically signed by Mohan Clayton PA-C on 9/21/2021 at 9:15 AM

## 2021-09-23 ENCOUNTER — HOSPITAL ENCOUNTER (OUTPATIENT)
Dept: GENERAL RADIOLOGY | Facility: CLINIC | Age: 56
Discharge: HOME | End: 2021-09-25
Payer: COMMERCIAL

## 2021-09-23 ENCOUNTER — HOSPITAL ENCOUNTER (OUTPATIENT)
Facility: CLINIC | Age: 56
Discharge: HOME | End: 2021-09-25
Payer: COMMERCIAL

## 2021-09-23 DIAGNOSIS — M25.551 RIGHT HIP PAIN: ICD-10-CM

## 2021-09-23 PROCEDURE — 73502 X-RAY EXAM HIP UNI 2-3 VIEWS: CPT

## 2021-09-26 LAB
ABSOLUTE BASO #: 0.1 X10E9/L (ref 0–0.2)
ABSOLUTE EOS #: 0.3 X10E9/L (ref 0–0.4)
ABSOLUTE LYMPH #: 2.9 X10E9/L (ref 1–3.5)
ABSOLUTE MONO #: 0.6 X10E9/L (ref 0–0.9)
ABSOLUTE NEUT #: 3.4 X10E9/L (ref 1.5–6.6)
ALBUMIN SERPL-MCNC: 4.2 G/DL (ref 3.2–5.3)
ALK PHOSPHATASE: 70 U/L (ref 39–130)
ALT SERPL-CCNC: 19 U/L (ref 0–31)
ANION GAP SERPL CALCULATED.3IONS-SCNC: 8 MMOL/L (ref 5–15)
AST SERPL-CCNC: 16 U/L (ref 0–41)
BASOPHILS RELATIVE PERCENT: 0.7 %
BILIRUB SERPL-MCNC: 0.3 MG/DL (ref 0.3–1.2)
BUN BLDV-MCNC: 17 MG/DL (ref 5–23)
CALCIUM SERPL-MCNC: 9.2 MG/DL (ref 8.5–10.5)
CHLORIDE BLD-SCNC: 109 MMOL/L (ref 98–109)
CO2: 25 MMOL/L (ref 22–32)
CREAT SERPL-MCNC: 0.62 MG/DL (ref 0.4–1)
EGFR AFRICAN AMERICAN: >60 ML/MIN/1.73SQ.M
EGFR IF NONAFRICAN AMERICAN: >60 ML/MIN/1.73SQ.M
EOSINOPHILS RELATIVE PERCENT: 4.1 %
GLUCOSE: 115 MG/DL (ref 65–99)
HCT VFR BLD CALC: 42.3 % (ref 35–47)
HEMOGLOBIN: 13.9 G/DL (ref 11.7–15.5)
LYMPHOCYTE %: 40.4 %
MCH RBC QN AUTO: 28.6 PG (ref 27–34)
MCHC RBC AUTO-ENTMCNC: 32.9 G/DL (ref 32–36)
MCV RBC AUTO: 87 FL (ref 80–100)
MONOCYTES # BLD: 7.9 %
NEUTROPHILS RELATIVE PERCENT: 46.9 %
PDW BLD-RTO: 13.4 % (ref 11.5–15)
PLATELETS: 343 X10E9/L (ref 150–450)
PMV BLD AUTO: 8.8 FL (ref 7–12)
POTASSIUM SERPL-SCNC: 3.7 MMOL/L (ref 3.5–5)
RBC: 4.86 X10E12/L (ref 3.8–5.2)
SODIUM BLD-SCNC: 142 MMOL/L (ref 134–146)
T4 FREE: 0.93 NG/DL (ref 0.61–1.6)
TOTAL PROTEIN: 7 G/DL (ref 6–8)
TSH SERPL DL<=0.05 MIU/L-ACNC: 1.68 UIU/ML (ref 0.49–4.67)
VITAMIN B-12: 269 PG/ML (ref 180–914)
VITAMIN D 25-HYDROXY: 39.5 NG/ML (ref 30–100)
WBC: 7.2 X10E9/L (ref 4–11)

## 2021-11-02 ENCOUNTER — OFFICE VISIT (OUTPATIENT)
Dept: PRIMARY CARE CLINIC | Age: 56
End: 2021-11-02
Payer: COMMERCIAL

## 2021-11-02 VITALS
DIASTOLIC BLOOD PRESSURE: 80 MMHG | HEART RATE: 78 BPM | WEIGHT: 201.8 LBS | OXYGEN SATURATION: 97 % | BODY MASS INDEX: 38.1 KG/M2 | SYSTOLIC BLOOD PRESSURE: 134 MMHG | HEIGHT: 61 IN

## 2021-11-02 DIAGNOSIS — F17.210 CIGARETTE NICOTINE DEPENDENCE WITHOUT COMPLICATION: ICD-10-CM

## 2021-11-02 DIAGNOSIS — R73.01 IMPAIRED FASTING GLUCOSE: ICD-10-CM

## 2021-11-02 DIAGNOSIS — I10 PRIMARY HYPERTENSION: ICD-10-CM

## 2021-11-02 DIAGNOSIS — K59.04 CHRONIC IDIOPATHIC CONSTIPATION: ICD-10-CM

## 2021-11-02 DIAGNOSIS — G47.26 SHIFT WORK SLEEP DISORDER: Primary | ICD-10-CM

## 2021-11-02 PROCEDURE — 99213 OFFICE O/P EST LOW 20 MIN: CPT | Performed by: PHYSICIAN ASSISTANT

## 2021-11-02 RX ORDER — CELECOXIB 100 MG/1
100 CAPSULE ORAL 2 TIMES DAILY
Qty: 60 CAPSULE | Refills: 0 | Status: SHIPPED | OUTPATIENT
Start: 2021-11-02 | End: 2021-11-30

## 2021-11-02 RX ORDER — ARMODAFINIL 150 MG/1
150 TABLET ORAL DAILY
Qty: 30 TABLET | Refills: 2 | Status: SHIPPED | OUTPATIENT
Start: 2021-11-02 | End: 2021-12-02

## 2021-11-02 SDOH — ECONOMIC STABILITY: FOOD INSECURITY: WITHIN THE PAST 12 MONTHS, THE FOOD YOU BOUGHT JUST DIDN'T LAST AND YOU DIDN'T HAVE MONEY TO GET MORE.: NEVER TRUE

## 2021-11-02 SDOH — ECONOMIC STABILITY: FOOD INSECURITY: WITHIN THE PAST 12 MONTHS, YOU WORRIED THAT YOUR FOOD WOULD RUN OUT BEFORE YOU GOT MONEY TO BUY MORE.: NEVER TRUE

## 2021-11-02 ASSESSMENT — ENCOUNTER SYMPTOMS
ABDOMINAL PAIN: 0
RHINORRHEA: 0
DIARRHEA: 0
SORE THROAT: 0
SHORTNESS OF BREATH: 0
COUGH: 0
VOMITING: 0
EYE REDNESS: 0
NAUSEA: 0
EYE DISCHARGE: 0
WHEEZING: 0

## 2021-11-02 ASSESSMENT — SOCIAL DETERMINANTS OF HEALTH (SDOH): HOW HARD IS IT FOR YOU TO PAY FOR THE VERY BASICS LIKE FOOD, HOUSING, MEDICAL CARE, AND HEATING?: NOT HARD AT ALL

## 2021-11-02 NOTE — PROGRESS NOTES
[address] [place] [address] PRIMARY CARE  [address] [place] [address] B  [address] [address]  Dept: [phone]    Alyne Landau is a 64 y.o. female who presents today for her medical conditions/complaints as noted below. Chief Complaint   Patient presents with    Discuss Labs     discuss lab work and XR- results in chart. Patient said she is still having Left hip pain       HPI:     HPI  Labs are normal   X-ray showing spurring on trochanter of right  Hip  PT was helping but had to stop due to working    Probiotic does help---not taking daily     LDL Cholesterol (mg/dL)   Date Value   2019 136 (H)   2018 106   10/20/2017 119     LDL Calculated (mg/dL)   Date Value   2021 118       (goal LDL is <100)   AST (U/L)   Date Value   2021 16     ALT (U/L)   Date Value   2021 19     BUN (mg/dL)   Date Value   2021 17     BP Readings from Last 3 Encounters:   21 134/80   21 136/82   21 134/84          (goal 120/80)    Past Medical History:   Diagnosis Date    Hypertension       Past Surgical History:   Procedure Laterality Date    ADRENAL GLAND SURGERY Left 2013     SECTION      HYSTERECTOMY      with family hx of ovarian cancer    TONSILLECTOMY         Family History   Problem Relation Age of Onset    Diabetes Father     Diabetes Paternal Grandmother     Ovarian Cancer Mother     Diabetes Paternal Cousin         2    Colon Cancer Brother     Breast Cancer Maternal Aunt     Thyroid Cancer Other     Other Other         retinoblastoma    Other Son         suicide       Social History     Tobacco Use    Smoking status: Current Every Day Smoker     Packs/day: 0.50     Years: 20.00     Pack years: 10.00     Types: Cigarettes    Smokeless tobacco: Never Used   Substance Use Topics    Alcohol use:  Yes     Alcohol/week: 0.0 standard drinks     Comment: socially      Current Outpatient Medications   Medication Sig Dispense Refill    Negative for discharge and redness. Respiratory: Negative for cough, shortness of breath and wheezing. Cardiovascular: Negative for chest pain and palpitations. Gastrointestinal: Negative for abdominal pain, diarrhea, nausea and vomiting. Genitourinary: Negative for dysuria and frequency. Musculoskeletal: Negative for arthralgias and myalgias. Neurological: Negative for dizziness, light-headedness and headaches. Psychiatric/Behavioral: Positive for sleep disturbance. Objective:     /80   Pulse 78   Ht 5' 1\" (1.549 m)   Wt 201 lb 12.8 oz (91.5 kg)   SpO2 97%   BMI 38.13 kg/m²   Physical Exam  Vitals and nursing note reviewed. Constitutional:       General: She is not in acute distress. Appearance: She is well-developed. She is not ill-appearing. HENT:      Head: Normocephalic and atraumatic. Right Ear: External ear normal.      Left Ear: External ear normal.   Eyes:      General: No scleral icterus. Right eye: No discharge. Left eye: No discharge. Conjunctiva/sclera: Conjunctivae normal.      Pupils: Pupils are equal, round, and reactive to light. Neck:      Thyroid: No thyromegaly. Trachea: No tracheal deviation. Cardiovascular:      Rate and Rhythm: Normal rate and regular rhythm. Heart sounds: Normal heart sounds. Pulmonary:      Effort: Pulmonary effort is normal. No respiratory distress. Breath sounds: Normal breath sounds. No wheezing. Lymphadenopathy:      Cervical: No cervical adenopathy. Skin:     General: Skin is warm. Findings: No rash. Neurological:      Mental Status: She is alert and oriented to person, place, and time. Psychiatric:         Mood and Affect: Mood normal.         Behavior: Behavior normal.         Thought Content: Thought content normal.         Assessment:       Diagnosis Orders   1. Shift work sleep disorder  Armodafinil (NUVIGIL) 150 MG TABS tablet   2. Impaired fasting glucose     3. Primary hypertension     4. Cigarette nicotine dependence without complication     5. Chronic idiopathic constipation          Plan:    Start Nuvigil   Work on diet and exercise  Talked about tobacco cessation  REfills sent  Take Probiotic daily    Return in about 3 months (around 2/2/2022) for Nuvigil and weight management. No orders of the defined types were placed in this encounter. Orders Placed This Encounter   Medications    celecoxib (CELEBREX) 100 MG capsule     Sig: Take 1 capsule by mouth 2 times daily     Dispense:  60 capsule     Refill:  0    Armodafinil (NUVIGIL) 150 MG TABS tablet     Sig: Take 1 tablet by mouth daily for 30 days. Dispense:  30 tablet     Refill:  2       Patient given educational materials - see patient instructions. Discussed use, benefit, and side effects of prescribed medications. All patient questions answered. Pt voiced understanding. Reviewed health maintenance. Instructed to continue current medications, diet and exercise. Patient agreed with treatment plan. Follow up as directed.      Electronically signed by Stella Agarwal PA-C on 11/2/2021 at 9:05 AM

## 2022-01-06 ENCOUNTER — TELEPHONE (OUTPATIENT)
Dept: PRIMARY CARE CLINIC | Age: 57
End: 2022-01-06

## 2022-01-06 DIAGNOSIS — I10 ESSENTIAL HYPERTENSION: ICD-10-CM

## 2022-01-06 RX ORDER — NIFEDIPINE 60 MG/1
TABLET, FILM COATED, EXTENDED RELEASE ORAL
Qty: 90 TABLET | Refills: 0 | Status: SHIPPED | OUTPATIENT
Start: 2022-01-06 | End: 2022-03-14

## 2022-01-06 NOTE — TELEPHONE ENCOUNTER
Did she have monoclonal antibodies? If so, 90 days. If not, then I suggest 4 weeks to avoid reaction to vaccine.

## 2022-01-13 NOTE — TELEPHONE ENCOUNTER
Pt called back stating she did not get the monoclonal ab and is going to call next week to schedule if she wants to get done here depending on work schedule

## 2022-01-14 DIAGNOSIS — I10 ESSENTIAL HYPERTENSION: ICD-10-CM

## 2022-01-14 RX ORDER — LISINOPRIL 10 MG/1
TABLET ORAL
Qty: 90 TABLET | Refills: 1 | Status: SHIPPED | OUTPATIENT
Start: 2022-01-14 | End: 2022-03-25

## 2022-01-25 ENCOUNTER — TELEPHONE (OUTPATIENT)
Dept: PRIMARY CARE CLINIC | Age: 57
End: 2022-01-25

## 2022-01-25 RX ORDER — SCOLOPAMINE TRANSDERMAL SYSTEM 1 MG/1
1 PATCH, EXTENDED RELEASE TRANSDERMAL
Qty: 10 PATCH | Refills: 0 | Status: SHIPPED | OUTPATIENT
Start: 2022-01-25 | End: 2022-02-07

## 2022-01-25 NOTE — TELEPHONE ENCOUNTER
Pt is leaving on a cruise Sat. Asking, if you would send in some Dramamine patches to go behind her ear? [facility]

## 2022-02-07 ENCOUNTER — OFFICE VISIT (OUTPATIENT)
Dept: PRIMARY CARE CLINIC | Age: 57
End: 2022-02-07
Payer: COMMERCIAL

## 2022-02-07 VITALS
DIASTOLIC BLOOD PRESSURE: 80 MMHG | OXYGEN SATURATION: 95 % | HEART RATE: 74 BPM | WEIGHT: 199 LBS | BODY MASS INDEX: 37.57 KG/M2 | SYSTOLIC BLOOD PRESSURE: 118 MMHG | HEIGHT: 61 IN

## 2022-02-07 DIAGNOSIS — E65 CENTRAL OBESITY: ICD-10-CM

## 2022-02-07 DIAGNOSIS — I10 PRIMARY HYPERTENSION: Primary | ICD-10-CM

## 2022-02-07 DIAGNOSIS — F17.210 CIGARETTE NICOTINE DEPENDENCE WITHOUT COMPLICATION: ICD-10-CM

## 2022-02-07 PROCEDURE — 99213 OFFICE O/P EST LOW 20 MIN: CPT | Performed by: PHYSICIAN ASSISTANT

## 2022-02-07 ASSESSMENT — PATIENT HEALTH QUESTIONNAIRE - PHQ9
1. LITTLE INTEREST OR PLEASURE IN DOING THINGS: 0
SUM OF ALL RESPONSES TO PHQ QUESTIONS 1-9: 0
2. FEELING DOWN, DEPRESSED OR HOPELESS: 0
SUM OF ALL RESPONSES TO PHQ QUESTIONS 1-9: 0
SUM OF ALL RESPONSES TO PHQ9 QUESTIONS 1 & 2: 0
SUM OF ALL RESPONSES TO PHQ QUESTIONS 1-9: 0
SUM OF ALL RESPONSES TO PHQ QUESTIONS 1-9: 0

## 2022-02-07 ASSESSMENT — ENCOUNTER SYMPTOMS
EYE DISCHARGE: 0
VOMITING: 0
SORE THROAT: 0
EYE REDNESS: 0
DIARRHEA: 0
RHINORRHEA: 0
SHORTNESS OF BREATH: 0
ABDOMINAL PAIN: 0
WHEEZING: 0
NAUSEA: 0
COUGH: 0

## 2022-02-07 NOTE — PROGRESS NOTES
[facility] PRIMARY CARE  [address] [place] [address]  [address] [address]  Dept: [phone]    Keira Garcia is a 64 y.o. female who presents today for her medical conditions/complaints as noted below. Chief Complaint   Patient presents with    Follow-up     patient is here today for 3mo folow up and weight management     Weight Management       HPI:     HPI  Started Nuvigil for shift work disorder and it kept her awake so she stopped it. Lost 3 pounds      LDL Cholesterol (mg/dL)   Date Value   2019 136 (H)   2018 106   10/20/2017 119     LDL Calculated (mg/dL)   Date Value   2021 118       (goal LDL is <100)   AST (U/L)   Date Value   2021 16     ALT (U/L)   Date Value   2021 19     BUN (mg/dL)   Date Value   2021 17     BP Readings from Last 3 Encounters:   22 118/80   21 134/80   21 136/82          (goal 120/80)    Past Medical History:   Diagnosis Date    Hypertension       Past Surgical History:   Procedure Laterality Date    ADRENAL GLAND SURGERY Left 2013     SECTION      HYSTERECTOMY  2010    with family hx of ovarian cancer    TONSILLECTOMY         Family History   Problem Relation Age of Onset    Diabetes Father     Diabetes Paternal Grandmother     Ovarian Cancer Mother     Diabetes Paternal Cousin         2    Colon Cancer Brother     Breast Cancer Maternal Aunt     Thyroid Cancer Other     Other Other         retinoblastoma    Other Son         suicide       Social History     Tobacco Use    Smoking status: Current Every Day Smoker     Packs/day: 0.50     Years: 20.00     Pack years: 10.00     Types: Cigarettes    Smokeless tobacco: Never Used   Substance Use Topics    Alcohol use:  Yes     Alcohol/week: 0.0 standard drinks     Comment: socially      Current Outpatient Medications   Medication Sig Dispense Refill    lisinopril (PRINIVIL;ZESTRIL) 10 MG tablet TAKE 1 TABLET DAILY 90 tablet 1    NIFEdipine (ADALAT CC) 60 MG extended release tablet TAKE 1 TABLET DAILY 90 tablet 0    estradiol (ESTRACE) 1 MG tablet TAKE 1 TABLET BY MOUTH ONE TIME A DAY  21 tablet 3    chlorhexidine (PERIDEX) 0.12 % solution RINSE WITH 1/2 OUNCE BY MOUTH TWICE DAILY AFTER BREAKFAST AND BEFORE BEDTIME  1     Current Facility-Administered Medications   Medication Dose Route Frequency Provider Last Rate Last Admin    zoster recombinant adjuvanted vaccine [facility]) injection 50 mcg  0.5 mL IntraMUSCular Once DESTINY Dawkins         Allergies   Allergen Reactions    No Known Allergies        Health Maintenance   Topic Date Due    Depression Screen  Never done    COVID-19 Vaccine (2 - Booster for Angela series) 06/01/2021    Flu vaccine (1) 09/21/2022 (Originally 9/1/2021)    Diabetes screen  05/28/2022    Potassium monitoring  09/25/2022    Creatinine monitoring  09/25/2022    Breast cancer screen  03/11/2023    DTaP/Tdap/Td vaccine (2 - Td or Tdap) 06/11/2025    Lipid screen  02/06/2026    Colon cancer screen colonoscopy  03/17/2026    Pneumococcal 0-64 years Vaccine (2 of 2 - PPSV23) 09/21/2030    Shingles Vaccine  Completed    Hepatitis C screen  Completed    HIV screen  Completed    Hepatitis A vaccine  Aged Out    Hepatitis B vaccine  Aged Out    Hib vaccine  Aged Out    Meningococcal (ACWY) vaccine  Aged Out       Subjective:      Review of Systems   Constitutional: Negative for chills and fever. HENT: Negative for rhinorrhea and sore throat. Eyes: Negative for discharge and redness. Respiratory: Negative for cough, shortness of breath and wheezing. Cardiovascular: Negative for chest pain and palpitations. Gastrointestinal: Negative for abdominal pain, diarrhea, nausea and vomiting. Genitourinary: Negative for dysuria and frequency. Musculoskeletal: Negative for arthralgias and myalgias. Neurological: Negative for dizziness, light-headedness and headaches. Psychiatric/Behavioral: Negative for sleep disturbance. Objective:     /80   Pulse 74   Ht 5' 1\" (1.549 m)   Wt 199 lb (90.3 kg)   SpO2 95%   BMI 37.60 kg/m²   Physical Exam  Vitals and nursing note reviewed. Constitutional:       General: She is not in acute distress. Appearance: She is well-developed. She is obese. She is not ill-appearing. HENT:      Head: Normocephalic and atraumatic. Right Ear: External ear normal.      Left Ear: External ear normal.   Eyes:      General: No scleral icterus. Right eye: No discharge. Left eye: No discharge. Conjunctiva/sclera: Conjunctivae normal.      Pupils: Pupils are equal, round, and reactive to light. Neck:      Thyroid: No thyromegaly. Trachea: No tracheal deviation. Cardiovascular:      Rate and Rhythm: Normal rate and regular rhythm. Heart sounds: Normal heart sounds. Pulmonary:      Effort: Pulmonary effort is normal. No respiratory distress. Breath sounds: Normal breath sounds. No wheezing. Lymphadenopathy:      Cervical: No cervical adenopathy. Skin:     General: Skin is warm. Findings: No rash. Neurological:      Mental Status: She is alert and oriented to person, place, and time. Psychiatric:         Mood and Affect: Mood normal.         Behavior: Behavior normal.         Thought Content: Thought content normal.         Assessment:       Diagnosis Orders   1. Primary hypertension     2. Cigarette nicotine dependence without complication     3. Central obesity          Plan:    [facility] list given--she will check with insurance. Schedule Covid booster     Return in about 6 months (around 8/7/2022) for HTN. No orders of the defined types were placed in this encounter. No orders of the defined types were placed in this encounter. Patient given educational materials - see patient instructions.   Discussed use, benefit, and side effects of prescribed medications. All patient questions answered. Pt voiced understanding. Reviewed health maintenance. Instructed to continue current medications, diet and exercise. Patient agreed with treatment plan. Follow up as directed.      Electronically signed by Pham Umaña PA-C on 2/7/2022 at 11:54 AM

## 2022-03-14 DIAGNOSIS — I10 ESSENTIAL HYPERTENSION: ICD-10-CM

## 2022-03-14 RX ORDER — NIFEDIPINE 60 MG/1
TABLET, FILM COATED, EXTENDED RELEASE ORAL
Qty: 90 TABLET | Refills: 3 | Status: SHIPPED | OUTPATIENT
Start: 2022-03-14

## 2022-03-25 DIAGNOSIS — I10 ESSENTIAL HYPERTENSION: ICD-10-CM

## 2022-03-25 RX ORDER — LISINOPRIL 10 MG/1
TABLET ORAL
Qty: 90 TABLET | Refills: 3 | Status: SHIPPED | OUTPATIENT
Start: 2022-03-25

## 2022-10-04 ENCOUNTER — OFFICE VISIT (OUTPATIENT)
Dept: PRIMARY CARE CLINIC | Age: 57
End: 2022-10-04
Payer: COMMERCIAL

## 2022-10-04 VITALS
HEIGHT: 61 IN | BODY MASS INDEX: 36.63 KG/M2 | SYSTOLIC BLOOD PRESSURE: 130 MMHG | HEART RATE: 77 BPM | WEIGHT: 194 LBS | DIASTOLIC BLOOD PRESSURE: 82 MMHG | OXYGEN SATURATION: 96 %

## 2022-10-04 DIAGNOSIS — J20.9 ACUTE BRONCHITIS, UNSPECIFIED ORGANISM: Primary | ICD-10-CM

## 2022-10-04 PROCEDURE — 99213 OFFICE O/P EST LOW 20 MIN: CPT | Performed by: PHYSICIAN ASSISTANT

## 2022-10-04 RX ORDER — BENZONATATE 200 MG/1
200 CAPSULE ORAL 3 TIMES DAILY PRN
COMMUNITY
Start: 2022-10-02 | End: 2022-10-12

## 2022-10-04 RX ORDER — AZITHROMYCIN 250 MG/1
TABLET, FILM COATED ORAL
Qty: 1 PACKET | Refills: 0 | Status: SHIPPED | OUTPATIENT
Start: 2022-10-04 | End: 2022-10-14

## 2022-10-04 RX ORDER — PREDNISONE 20 MG/1
40 TABLET ORAL DAILY
COMMUNITY
Start: 2022-10-02 | End: 2022-10-07

## 2022-10-04 ASSESSMENT — ENCOUNTER SYMPTOMS
SORE THROAT: 1
VOMITING: 0
COUGH: 1
NAUSEA: 0
RHINORRHEA: 0
SHORTNESS OF BREATH: 0
DIARRHEA: 0
WHEEZING: 0

## 2022-10-04 NOTE — PROGRESS NOTES
[facility] Primary Care  [address]  Phone: [phone]  Fax: [phone]    Farrah José is a 62 y.o. female who presents today for her medical conditions/complaintsas noted below. Chief Complaint   Patient presents with    Cough     Patient went to  [facility] on 10/2/22  for a cough and was given prednisone and Benzonatate. Patient said she has not took the Steroid. Patient said it will be a week tomorrow since she had the cough. Patient said she was tested for strep and COVID and both came back negative but her throat is sore from coughing. HPI:     Cough  Associated symptoms include a sore throat. Pertinent negatives include no chills, fever, headaches, myalgias, postnasal drip, rhinorrhea, shortness of breath or wheezing. Cough is the worst symptom. Her chest, throat and abdomen are bothering her from this. NO fever. She is a smoker. Cough was dry and is now productive. Current Outpatient Medications   Medication Sig Dispense Refill    benzonatate (TESSALON) 200 MG capsule Take 200 mg by mouth 3 times daily as needed      azithromycin (ZITHROMAX) 250 MG tablet 2 tablets now then 1 daily until gone.  1 packet 0    lisinopril (PRINIVIL;ZESTRIL) 10 MG tablet TAKE 1 TABLET BY MOUTH  DAILY 90 tablet 3    NIFEdipine (ADALAT CC) 60 MG extended release tablet TAKE 1 TABLET BY MOUTH  DAILY 90 tablet 3    estradiol (ESTRACE) 1 MG tablet TAKE 1 TABLET BY MOUTH ONE TIME A DAY  21 tablet 3    predniSONE (DELTASONE) 20 MG tablet Take 40 mg by mouth daily (Patient not taking: Reported on 10/4/2022)      chlorhexidine (PERIDEX) 0.12 % solution RINSE WITH 1/2 OUNCE BY MOUTH TWICE DAILY AFTER BREAKFAST AND BEFORE BEDTIME  1     Current Facility-Administered Medications   Medication Dose Route Frequency Provider Last Rate Last Admin    zoster recombinant adjuvanted vaccine [facility]) injection 50 mcg  0.5 mL IntraMUSCular Once DESTINY Bergman         Allergies Allergen Reactions    No Known Allergies        Subjective:      Review of Systems   Constitutional:  Negative for chills, diaphoresis and fever. HENT:  Positive for sore throat. Negative for congestion, postnasal drip and rhinorrhea. Respiratory:  Positive for cough (now productive). Negative for shortness of breath and wheezing. Gastrointestinal:  Negative for diarrhea, nausea and vomiting. Musculoskeletal:  Negative for arthralgias and myalgias. Neurological:  Negative for headaches. Objective:     /82   Pulse 77   Ht 5' 1\" (1.549 m)   Wt 194 lb (88 kg)   SpO2 96%   BMI 36.66 kg/m²   Physical Exam  Vitals and nursing note reviewed. Constitutional:       Appearance: Normal appearance. Cardiovascular:      Rate and Rhythm: Normal rate and regular rhythm. Heart sounds: Normal heart sounds. Pulmonary:      Effort: Pulmonary effort is normal.      Breath sounds: Decreased air movement present. No wheezing, rhonchi or rales. Neurological:      Mental Status: She is alert. Assessment:       Diagnosis Orders   1. Acute bronchitis, unspecified organism  azithromycin (ZITHROMAX) 250 MG tablet           Plan:    Continue tessalon perles  May take the prednisone  Zpak given  Off work today  Rest and fluids    Return if symptoms worsen or fail to improve. No orders of the defined types were placed in this encounter. Orders Placed This Encounter   Medications    azithromycin (ZITHROMAX) 250 MG tablet     Si tablets now then 1 daily until gone.      Dispense:  1 packet     Refill:  0             Electronically signed by Korin Proctor 10/4/2022 at 11:18 AM

## 2022-10-08 ENCOUNTER — HOSPITAL ENCOUNTER (OUTPATIENT)
Dept: WOMENS IMAGING | Age: 57
Discharge: HOME | End: 2022-10-10
Payer: COMMERCIAL

## 2022-10-08 DIAGNOSIS — Z12.31 ENCOUNTER FOR SCREENING MAMMOGRAM FOR MALIGNANT NEOPLASM OF BREAST: ICD-10-CM

## 2022-10-08 PROCEDURE — 77063 BREAST TOMOSYNTHESIS BI: CPT

## 2023-04-11 DIAGNOSIS — I10 ESSENTIAL HYPERTENSION: ICD-10-CM

## 2023-04-12 RX ORDER — LISINOPRIL 10 MG/1
TABLET ORAL
Qty: 90 TABLET | Refills: 0 | Status: SHIPPED | OUTPATIENT
Start: 2023-04-12 | End: 2023-05-08 | Stop reason: SDUPTHER

## 2023-04-20 DIAGNOSIS — I10 ESSENTIAL HYPERTENSION: ICD-10-CM

## 2023-04-20 RX ORDER — NIFEDIPINE 60 MG/1
TABLET, FILM COATED, EXTENDED RELEASE ORAL
Qty: 90 TABLET | Refills: 1 | Status: SHIPPED | OUTPATIENT
Start: 2023-04-20

## 2023-05-08 ENCOUNTER — OFFICE VISIT (OUTPATIENT)
Dept: PRIMARY CARE CLINIC | Age: 58
End: 2023-05-08
Payer: COMMERCIAL

## 2023-05-08 VITALS
OXYGEN SATURATION: 94 % | BODY MASS INDEX: 37.16 KG/M2 | HEART RATE: 79 BPM | WEIGHT: 196.8 LBS | HEIGHT: 61 IN | DIASTOLIC BLOOD PRESSURE: 84 MMHG | SYSTOLIC BLOOD PRESSURE: 136 MMHG

## 2023-05-08 DIAGNOSIS — R53.83 FATIGUE, UNSPECIFIED TYPE: ICD-10-CM

## 2023-05-08 DIAGNOSIS — I10 ESSENTIAL HYPERTENSION: ICD-10-CM

## 2023-05-08 DIAGNOSIS — E66.01 CLASS 2 SEVERE OBESITY WITH SERIOUS COMORBIDITY AND BODY MASS INDEX (BMI) OF 37.0 TO 37.9 IN ADULT, UNSPECIFIED OBESITY TYPE (HCC): ICD-10-CM

## 2023-05-08 DIAGNOSIS — I10 PRIMARY HYPERTENSION: Primary | ICD-10-CM

## 2023-05-08 DIAGNOSIS — E78.2 MIXED HYPERLIPIDEMIA: ICD-10-CM

## 2023-05-08 DIAGNOSIS — R73.01 IMPAIRED FASTING GLUCOSE: ICD-10-CM

## 2023-05-08 PROCEDURE — 3075F SYST BP GE 130 - 139MM HG: CPT | Performed by: PHYSICIAN ASSISTANT

## 2023-05-08 PROCEDURE — 3079F DIAST BP 80-89 MM HG: CPT | Performed by: PHYSICIAN ASSISTANT

## 2023-05-08 PROCEDURE — 99214 OFFICE O/P EST MOD 30 MIN: CPT | Performed by: PHYSICIAN ASSISTANT

## 2023-05-08 RX ORDER — SEMAGLUTIDE 0.25 MG/.5ML
0.25 INJECTION, SOLUTION SUBCUTANEOUS
Qty: 2 ML | Refills: 0 | Status: SHIPPED | OUTPATIENT
Start: 2023-05-08

## 2023-05-08 RX ORDER — LISINOPRIL 10 MG/1
10 TABLET ORAL DAILY
Qty: 90 TABLET | Refills: 3 | Status: SHIPPED | OUTPATIENT
Start: 2023-05-08

## 2023-05-08 SDOH — ECONOMIC STABILITY: FOOD INSECURITY: WITHIN THE PAST 12 MONTHS, THE FOOD YOU BOUGHT JUST DIDN'T LAST AND YOU DIDN'T HAVE MONEY TO GET MORE.: NEVER TRUE

## 2023-05-08 SDOH — ECONOMIC STABILITY: HOUSING INSECURITY
IN THE LAST 12 MONTHS, WAS THERE A TIME WHEN YOU DID NOT HAVE A STEADY PLACE TO SLEEP OR SLEPT IN A SHELTER (INCLUDING NOW)?: NO

## 2023-05-08 SDOH — ECONOMIC STABILITY: INCOME INSECURITY: HOW HARD IS IT FOR YOU TO PAY FOR THE VERY BASICS LIKE FOOD, HOUSING, MEDICAL CARE, AND HEATING?: NOT HARD AT ALL

## 2023-05-08 SDOH — ECONOMIC STABILITY: FOOD INSECURITY: WITHIN THE PAST 12 MONTHS, YOU WORRIED THAT YOUR FOOD WOULD RUN OUT BEFORE YOU GOT MONEY TO BUY MORE.: NEVER TRUE

## 2023-05-08 ASSESSMENT — ENCOUNTER SYMPTOMS
ABDOMINAL PAIN: 0
COLOR CHANGE: 0
CHEST TIGHTNESS: 0
COUGH: 0
SHORTNESS OF BREATH: 0
APNEA: 0

## 2023-05-08 ASSESSMENT — PATIENT HEALTH QUESTIONNAIRE - PHQ9
SUM OF ALL RESPONSES TO PHQ QUESTIONS 1-9: 0
2. FEELING DOWN, DEPRESSED OR HOPELESS: 0
SUM OF ALL RESPONSES TO PHQ QUESTIONS 1-9: 0
SUM OF ALL RESPONSES TO PHQ QUESTIONS 1-9: 0
SUM OF ALL RESPONSES TO PHQ9 QUESTIONS 1 & 2: 0
SUM OF ALL RESPONSES TO PHQ QUESTIONS 1-9: 0
1. LITTLE INTEREST OR PLEASURE IN DOING THINGS: 0

## 2023-05-08 NOTE — PROGRESS NOTES
current medications, diet and exercise. Patient agreed with treatment plan. Follow up as directed.      Electronically signed by Noah Zhang PA-C on 5/8/2023 at 3:10 PM

## 2023-05-10 ENCOUNTER — TELEPHONE (OUTPATIENT)
Dept: PRIMARY CARE CLINIC | Age: 58
End: 2023-05-10

## 2023-05-22 LAB
AVERAGE GLUCOSE: 111 MG/DL
HBA1C MFR BLD: 5.5 % (ref 4.2–5.6)
VITAMIN D 25-HYDROXY: 48 NG/ML

## 2023-06-02 ENCOUNTER — TELEPHONE (OUTPATIENT)
Dept: PRIMARY CARE CLINIC | Age: 58
End: 2023-06-02

## 2023-06-12 PROBLEM — Z79.899 MEDICATION MANAGEMENT: Status: ACTIVE | Noted: 2023-06-12

## 2023-06-21 ENCOUNTER — TELEPHONE (OUTPATIENT)
Dept: PRIMARY CARE CLINIC | Age: 58
End: 2023-06-21

## 2023-06-28 ENCOUNTER — OFFICE VISIT (OUTPATIENT)
Dept: PRIMARY CARE CLINIC | Age: 58
End: 2023-06-28
Payer: COMMERCIAL

## 2023-06-28 VITALS
HEIGHT: 61 IN | BODY MASS INDEX: 36.74 KG/M2 | HEART RATE: 72 BPM | DIASTOLIC BLOOD PRESSURE: 72 MMHG | OXYGEN SATURATION: 93 % | SYSTOLIC BLOOD PRESSURE: 130 MMHG | WEIGHT: 194.6 LBS

## 2023-06-28 DIAGNOSIS — D48.5 NEOPLASM OF UNCERTAIN BEHAVIOR OF SKIN: Primary | ICD-10-CM

## 2023-06-28 DIAGNOSIS — L72.3 SEBACEOUS CYST: ICD-10-CM

## 2023-06-28 DIAGNOSIS — D18.01 CHERRY ANGIOMA: ICD-10-CM

## 2023-06-28 DIAGNOSIS — L81.4 LENTIGINES: ICD-10-CM

## 2023-06-28 DIAGNOSIS — L91.8 CUTANEOUS SKIN TAGS: ICD-10-CM

## 2023-06-28 DIAGNOSIS — L81.8 TATTOOS: ICD-10-CM

## 2023-06-28 DIAGNOSIS — L82.1 SEBORRHEIC KERATOSES: ICD-10-CM

## 2023-06-28 PROCEDURE — 3078F DIAST BP <80 MM HG: CPT | Performed by: PHYSICIAN ASSISTANT

## 2023-06-28 PROCEDURE — 3075F SYST BP GE 130 - 139MM HG: CPT | Performed by: PHYSICIAN ASSISTANT

## 2023-06-28 PROCEDURE — 99214 OFFICE O/P EST MOD 30 MIN: CPT | Performed by: PHYSICIAN ASSISTANT

## 2023-08-03 ENCOUNTER — TELEPHONE (OUTPATIENT)
Dept: PRIMARY CARE CLINIC | Age: 58
End: 2023-08-03

## 2023-08-03 NOTE — TELEPHONE ENCOUNTER
----- Message from Esther Ritchie sent at 8/3/2023 10:55 AM EDT -----  Subject: Message to Provider    QUESTIONS  Information for Provider? pt. is returning office call per pt. office   called for to reschedule due to procedure room only available on Fridays   please, call pt.  before she comes to her appt that is set for 8/3/2023 @   3:30 PM line was busy when we tried to call  ---------------------------------------------------------------------------  --------------  [address]  [phone]; OK to leave message on voicemail  ---------------------------------------------------------------------------  --------------  SCRIPT ANSWERS  undefined

## 2023-08-03 NOTE — TELEPHONE ENCOUNTER
----- Message from Muriel Fry sent at 8/2/2023  4:32 PM EDT -----  Subject: Message to Provider    QUESTIONS  Information for Provider?  Nj Mora called back to reschedule the skin tag   removal, please contact her again  ---------------------------------------------------------------------------  --------------  [address]  [phone]; OK to leave message on voicemail  ---------------------------------------------------------------------------  --------------  SCRIPT ANSWERS  undefined

## 2023-09-08 ENCOUNTER — PROCEDURE VISIT (OUTPATIENT)
Dept: PRIMARY CARE CLINIC | Age: 58
End: 2023-09-08

## 2023-09-08 VITALS
HEIGHT: 61 IN | DIASTOLIC BLOOD PRESSURE: 78 MMHG | SYSTOLIC BLOOD PRESSURE: 128 MMHG | HEART RATE: 79 BPM | BODY MASS INDEX: 36.63 KG/M2 | OXYGEN SATURATION: 94 % | WEIGHT: 194 LBS

## 2023-09-08 DIAGNOSIS — D48.5 NEOPLASM OF UNCERTAIN BEHAVIOR OF SKIN: Primary | ICD-10-CM

## 2023-09-11 ENCOUNTER — NURSE ONLY (OUTPATIENT)
Dept: LAB | Age: 58
End: 2023-09-11

## 2023-10-03 DIAGNOSIS — I10 ESSENTIAL HYPERTENSION: ICD-10-CM

## 2023-10-04 RX ORDER — NIFEDIPINE 60 MG/1
TABLET, FILM COATED, EXTENDED RELEASE ORAL
Qty: 90 TABLET | Refills: 3 | Status: SHIPPED | OUTPATIENT
Start: 2023-10-04

## 2023-11-24 ENCOUNTER — TELEPHONE (OUTPATIENT)
Dept: PRIMARY CARE CLINIC | Age: 58
End: 2023-11-24

## 2023-11-24 NOTE — TELEPHONE ENCOUNTER
I will need to see her in the office to examine her to know if an image is needed. You can use a provider fill slot for this.

## 2023-11-24 NOTE — TELEPHONE ENCOUNTER
Pt call stating she fell and went to the ER last night , they did xray     Pt was told to give her doctor a call if she was not feeling better and if the medication given was not     Pt is requesting MRI or ultrasound to see if something is torn     She can not sit on her left side due to the pain

## 2023-12-01 ENCOUNTER — TELEPHONE (OUTPATIENT)
Dept: PRIMARY CARE CLINIC | Age: 58
End: 2023-12-01

## 2023-12-01 NOTE — TELEPHONE ENCOUNTER
GINO for pt to call and schedule appt with Emi Hinson on mon. Emi Hinson said that 15 min slot ok for er f/up.

## 2023-12-04 ENCOUNTER — HOSPITAL ENCOUNTER (OUTPATIENT)
Dept: VASCULAR LAB | Age: 58
Discharge: HOME | End: 2023-12-06
Payer: COMMERCIAL

## 2023-12-04 ENCOUNTER — OFFICE VISIT (OUTPATIENT)
Dept: PRIMARY CARE CLINIC | Age: 58
End: 2023-12-04
Payer: COMMERCIAL

## 2023-12-04 VITALS
OXYGEN SATURATION: 98 % | WEIGHT: 191.6 LBS | BODY MASS INDEX: 36.17 KG/M2 | HEART RATE: 77 BPM | DIASTOLIC BLOOD PRESSURE: 80 MMHG | HEIGHT: 61 IN | SYSTOLIC BLOOD PRESSURE: 118 MMHG

## 2023-12-04 DIAGNOSIS — M79.89 LEFT LEG SWELLING: ICD-10-CM

## 2023-12-04 DIAGNOSIS — M79.605 LEFT LEG PAIN: ICD-10-CM
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
## 2023-12-04 LAB — ECHO BSA: 1.93 M2

## 2023-12-04 PROCEDURE — 3074F SYST BP LT 130 MM HG: CPT | Performed by: PHYSICIAN ASSISTANT

## 2023-12-04 PROCEDURE — 99214 OFFICE O/P EST MOD 30 MIN: CPT | Performed by: PHYSICIAN ASSISTANT

## 2023-12-04 PROCEDURE — 93971 EXTREMITY STUDY: CPT

## 2023-12-04 PROCEDURE — 3079F DIAST BP 80-89 MM HG: CPT | Performed by: PHYSICIAN ASSISTANT

## 2023-12-04 PROCEDURE — 93971 EXTREMITY STUDY: CPT | Performed by: SURGERY

## 2023-12-04 RX ORDER — ACETAMINOPHEN AND CODEINE PHOSPHATE 300; 30 MG/1; MG/1
1 TABLET ORAL EVERY 4 HOURS PRN
Qty: 30 TABLET | Refills: 0 | Status: SHIPPED | OUTPATIENT
Start: 2023-12-04 | End: 2023-12-11

## 2023-12-04 RX ORDER — TIZANIDINE 4 MG/1
TABLET ORAL
COMMUNITY
End: 2023-12-04 | Stop reason: ALTCHOICE

## 2023-12-04 RX ORDER — LIDOCAINE 50 MG/G
PATCH TOPICAL
COMMUNITY

## 2023-12-04 RX ORDER — NAPROXEN 500 MG/1
500 TABLET ORAL 2 TIMES DAILY
COMMUNITY
End: 2023-12-04 | Stop reason: SDUPTHER

## 2023-12-04 RX ORDER — NAPROXEN 500 MG/1
500 TABLET ORAL 2 TIMES DAILY
Qty: 60 TABLET | Refills: 0 | Status: SHIPPED | OUTPATIENT
Start: 2023-12-04

## 2023-12-04 ASSESSMENT — ENCOUNTER SYMPTOMS
BACK PAIN: 0
GASTROINTESTINAL NEGATIVE: 1
RESPIRATORY NEGATIVE: 1

## 2023-12-04 NOTE — PROGRESS NOTES
[address] PRIMARY CARE  [place] [address]  Dept: [phone]    Nidia Mohan is a 62 y.o. female who presents today for her medical conditions/complaints as noted below. Chief Complaint   Patient presents with    Fall     Pt when to [address] [place] [address] for a fall on - pt is concerned for a blood blot on Left let -- pt states X-rays were done at [facility]:     HPI  Was in ER twice for left leg pain. She originally fell and her legs went into a split motion. She has been having pain in left butt and leg. Very swollen in knee at first, but not red or warm for leg. Over the weekend she had warmth and redness in calf     X-rays showed no fracture but lumbar spondylosis. Can't sit on left buttock   Was given muscle relaxer, steroid pack, Naproxen, and Lidioderm first time  2nd time was given toradol shot only   Pain level 6/10 and with work it gets to a 10/10.  Works standing on concrete for long hours  LDL Cholesterol (mg/dL)   Date Value   2019 136 (H)   2018 106   10/20/2017 119     LDL Calculated (mg/dL)   Date Value   2023 124 (H)   2021 118       (goal LDL is <100)   AST (U/L)   Date Value   2023 16     ALT (U/L)   Date Value   2023 18     BUN (mg/dL)   Date Value   2023 13     BP Readings from Last 3 Encounters:   23 118/80   23 128/78   23 130/72          (goal 120/80)    Past Medical History:   Diagnosis Date    Hypertension       Past Surgical History:   Procedure Laterality Date    ADRENAL GLAND SURGERY Left 2013     SECTION      HYSTERECTOMY (CERVIX STATUS UNKNOWN)  2010    with family hx of ovarian cancer    OVARY REMOVAL      TONSILLECTOMY         Family History   Problem Relation Age of Onset    Diabetes Father     Diabetes Paternal Grandmother     Ovarian Cancer Mother     Diabetes Paternal Cousin         2    Colon Cancer Brother     Breast Cancer Maternal

## 2023-12-07 ENCOUNTER — HOSPITAL ENCOUNTER (OUTPATIENT)
Dept: WOMENS IMAGING | Age: 58
Discharge: HOME | End: 2023-12-09
Payer: COMMERCIAL

## 2023-12-07 VITALS — WEIGHT: 190 LBS | HEIGHT: 61 IN | BODY MASS INDEX: 35.87 KG/M2

## 2023-12-07 DIAGNOSIS — Z12.31 SCREENING MAMMOGRAM, ENCOUNTER FOR: ICD-10-CM

## 2023-12-07 PROCEDURE — 77063 BREAST TOMOSYNTHESIS BI: CPT

## 2024-05-18 DIAGNOSIS — I10 ESSENTIAL HYPERTENSION: ICD-10-CM

## 2024-05-21 RX ORDER — LISINOPRIL 10 MG/1
10 TABLET ORAL DAILY
Qty: 90 TABLET | Refills: 3 | Status: SHIPPED | OUTPATIENT
Start: 2024-05-21 | End: 2024-05-24 | Stop reason: SDUPTHER

## 2024-05-23 SDOH — ECONOMIC STABILITY: TRANSPORTATION INSECURITY
IN THE PAST 12 MONTHS, HAS LACK OF TRANSPORTATION KEPT YOU FROM MEETINGS, WORK, OR FROM GETTING THINGS NEEDED FOR DAILY LIVING?: NO

## 2024-05-23 SDOH — ECONOMIC STABILITY: FOOD INSECURITY: WITHIN THE PAST 12 MONTHS, THE FOOD YOU BOUGHT JUST DIDN'T LAST AND YOU DIDN'T HAVE MONEY TO GET MORE.: NEVER TRUE

## 2024-05-23 SDOH — ECONOMIC STABILITY: INCOME INSECURITY: HOW HARD IS IT FOR YOU TO PAY FOR THE VERY BASICS LIKE FOOD, HOUSING, MEDICAL CARE, AND HEATING?: NOT HARD AT ALL

## 2024-05-23 SDOH — ECONOMIC STABILITY: FOOD INSECURITY: WITHIN THE PAST 12 MONTHS, YOU WORRIED THAT YOUR FOOD WOULD RUN OUT BEFORE YOU GOT MONEY TO BUY MORE.: NEVER TRUE

## 2024-05-23 ASSESSMENT — ENCOUNTER SYMPTOMS
SHORTNESS OF BREATH: 0
ABDOMINAL PAIN: 0
COLOR CHANGE: 0
COUGH: 0
CHEST TIGHTNESS: 0
APNEA: 0

## 2024-05-23 ASSESSMENT — PATIENT HEALTH QUESTIONNAIRE - PHQ9
1. LITTLE INTEREST OR PLEASURE IN DOING THINGS: NOT AT ALL
1. LITTLE INTEREST OR PLEASURE IN DOING THINGS: NOT AT ALL
2. FEELING DOWN, DEPRESSED OR HOPELESS: NOT AT ALL
SUM OF ALL RESPONSES TO PHQ QUESTIONS 1-9: 0
2. FEELING DOWN, DEPRESSED OR HOPELESS: NOT AT ALL
SUM OF ALL RESPONSES TO PHQ9 QUESTIONS 1 & 2: 0
SUM OF ALL RESPONSES TO PHQ9 QUESTIONS 1 & 2: 0
SUM OF ALL RESPONSES TO PHQ QUESTIONS 1-9: 0

## 2024-05-24 ENCOUNTER — OFFICE VISIT (OUTPATIENT)
Dept: PRIMARY CARE CLINIC | Age: 59
End: 2024-05-24
Payer: COMMERCIAL

## 2024-05-24 VITALS
HEIGHT: 61 IN | HEART RATE: 72 BPM | OXYGEN SATURATION: 95 % | DIASTOLIC BLOOD PRESSURE: 82 MMHG | WEIGHT: 195 LBS | BODY MASS INDEX: 36.82 KG/M2 | SYSTOLIC BLOOD PRESSURE: 122 MMHG

## 2024-05-24 DIAGNOSIS — I10 ESSENTIAL HYPERTENSION: Primary | ICD-10-CM

## 2024-05-24 DIAGNOSIS — E78.2 MIXED HYPERLIPIDEMIA: ICD-10-CM

## 2024-05-24 DIAGNOSIS — F17.210 CIGARETTE NICOTINE DEPENDENCE WITHOUT COMPLICATION: ICD-10-CM

## 2024-05-24 PROCEDURE — 3079F DIAST BP 80-89 MM HG: CPT | Performed by: PHYSICIAN ASSISTANT

## 2024-05-24 PROCEDURE — 3074F SYST BP LT 130 MM HG: CPT | Performed by: PHYSICIAN ASSISTANT

## 2024-05-24 PROCEDURE — 99214 OFFICE O/P EST MOD 30 MIN: CPT | Performed by: PHYSICIAN ASSISTANT

## 2024-05-24 RX ORDER — LISINOPRIL 20 MG/1
20 TABLET ORAL DAILY
Qty: 90 TABLET | Refills: 1 | Status: SHIPPED | OUTPATIENT
Start: 2024-05-24

## 2024-05-24 RX ORDER — VARENICLINE TARTRATE 1 MG/1
TABLET, FILM COATED ORAL
Qty: 60 TABLET | Refills: 2 | Status: SHIPPED | OUTPATIENT
Start: 2024-05-24

## 2024-05-24 RX ORDER — VARENICLINE TARTRATE 1 MG/1
1 TABLET, FILM COATED ORAL 2 TIMES DAILY
Qty: 60 TABLET | Refills: 1 | Status: SHIPPED | OUTPATIENT
Start: 2024-05-24

## 2024-05-24 RX ORDER — ATENOLOL 50 MG/1
TABLET ORAL
Qty: 90 TABLET | Refills: 1 | Status: SHIPPED | OUTPATIENT
Start: 2024-05-24

## 2024-05-24 NOTE — PROGRESS NOTES
[facility]  [facility] PRIMARY CARE  [address] [address]  Dept: [phone]    Robyn Delvalle is a 58 y.o. female who presents today for her medical conditions/complaints as noted below.      Chief Complaint   Patient presents with    Hypertension     Pt is here for a HTN f/u -- pt does not check bp at home        HPI:     HPI  Feels well, tired today worked 3rd shift     Still smoking about 1 pack in 1.5 days     Just saw GYN and she kept her on HRT    No components found for: \"LDLCHOLESTEROL\", \"LDLCALC\"    (goal LDL is <100)   AST (U/L)   Date Value   2023 16     ALT (U/L)   Date Value   2023 18     BUN (mg/dL)   Date Value   2023 13     BP Readings from Last 3 Encounters:   24 (!) 138/102   23 118/80   23 128/78          (goal 120/80)    Past Medical History:   Diagnosis Date    Hypertension       Past Surgical History:   Procedure Laterality Date    ADRENAL GLAND SURGERY Left 2013     SECTION      HYSTERECTOMY (CERVIX STATUS UNKNOWN)      with family hx of ovarian cancer    OVARY REMOVAL      TONSILLECTOMY         Family History   Problem Relation Age of Onset    Diabetes Father     Diabetes Paternal Grandmother     Ovarian Cancer Mother     Diabetes Paternal Cousin         2    Colon Cancer Brother     Breast Cancer Maternal Aunt     Thyroid Cancer Other     Other Other         retinoblastoma    Other Son         suicide       Social History     Tobacco Use    Smoking status: Every Day     Current packs/day: 0.50     Average packs/day: 0.5 packs/day for 20.0 years (10.0 ttl pk-yrs)     Types: Cigarettes    Smokeless tobacco: Never   Substance Use Topics    Alcohol use: Yes     Alcohol/week: 0.0 standard drinks of alcohol     Comment: socially      Current Outpatient Medications   Medication Sig Dispense Refill    lisinopril (PRINIVIL;ZESTRIL) 20 MG tablet Take 1 tablet by mouth daily 90 tablet 1    atenolol (TENORMIN)

## 2024-06-09 LAB
ALBUMIN: 4.2 G/DL (ref 3.5–5.2)
ALK PHOSPHATASE: 86 U/L (ref 40–136)
ALT SERPL-CCNC: 18 U/L (ref 5–40)
ANION GAP SERPL CALCULATED.3IONS-SCNC: 12 MEQ/L (ref 7–16)
AST SERPL-CCNC: 14 U/L (ref 9–40)
BILIRUB SERPL-MCNC: 0.4 MG/DL
BUN BLDV-MCNC: 11 MG/DL (ref 6–20)
CALCIUM SERPL-MCNC: 9.4 MG/DL (ref 8.5–10.5)
CHLORIDE BLD-SCNC: 103 MEQ/L (ref 95–107)
CHOLESTEROL, TOTAL: 228 MG/DL
CHOLESTEROL/HDL RATIO: 4.2 RATIO
CO2: 25 MEQ/L (ref 19–31)
CREAT SERPL-MCNC: 0.57 MG/DL (ref 0.6–1.3)
EGFR IF NONAFRICAN AMERICAN: 105 ML/MIN/1.73
GLUCOSE: 88 MG/DL (ref 70–99)
HDLC SERPL-MCNC: 54 MG/DL
LDL CHOLESTEROL: 132 MG/DL
LDL/HDL RATIO: 2.4 RATIO
POTASSIUM SERPL-SCNC: 4.2 MEQ/L (ref 3.5–5.4)
SODIUM BLD-SCNC: 140 MEQ/L (ref 133–146)
TOTAL PROTEIN: 6.9 G/DL (ref 6.1–8.3)
TRIGL SERPL-MCNC: 208 MG/DL
VLDLC SERPL CALC-MCNC: 42 MG/DL

## 2024-06-12 ENCOUNTER — TELEPHONE (OUTPATIENT)
Dept: PRIMARY CARE CLINIC | Age: 59
End: 2024-06-12

## 2024-06-12 NOTE — TELEPHONE ENCOUNTER
Patient called stating her recent bill is saying she had a [facility] visit on 5/24. She says she only came into the office for a medication follow up appointment. I explained to her that the billing department will be able to help her but I would send a message regardless just in case there was a way we could help.

## 2024-06-13 DIAGNOSIS — E78.2 MIXED HYPERLIPIDEMIA: Primary | ICD-10-CM

## 2024-06-20 ENCOUNTER — TELEPHONE (OUTPATIENT)
Dept: PRIMARY CARE CLINIC | Age: 59
End: 2024-06-20

## 2024-06-20 NOTE — TELEPHONE ENCOUNTER
Pt asking for her 5/24/24 office visit to be billed as a physical so that she does not have to pay the $60 bill she received.

## 2024-06-20 NOTE — TELEPHONE ENCOUNTER
The documentation will not satisfy a physical code. We would have to know that she is having a physical when we start the visit.

## 2024-08-25 NOTE — PROGRESS NOTES
[facility]  [facility] PRIMARY CARE  [address]  [place] [facility] [address]  Dept: [phone]    Robyn Delvalle is a 58 y.o. female who presents today for her medical conditions/complaints as noted below.      Chief Complaint   Patient presents with    Hypertension     Patient is here for hypertension f/u - patient is checking blood pressure at home and usually 132/87  Patient has not smoked in x3 months        HPI:     HPI  D/C nifedipine last visit and BPs have been ok     Using generic Chantix and stopped smoking     She is planning on getting [place] done in September after her cruise  No components found for: \"LDLCHOLESTEROL\", \"LDLCALC\"    (goal LDL is <100)   AST (U/L)   Date Value   2024 14     ALT (U/L)   Date Value   2024 18     BUN (mg/dL)   Date Value   2024 11     BP Readings from Last 3 Encounters:   24 114/80   24 122/82   23 118/80          (goal 120/80)    Past Medical History:   Diagnosis Date    Hypertension       Past Surgical History:   Procedure Laterality Date    ADRENAL GLAND SURGERY Left 2013     SECTION      HYSTERECTOMY (CERVIX STATUS UNKNOWN)      with family hx of ovarian cancer    OVARY REMOVAL      TONSILLECTOMY         Family History   Problem Relation Age of Onset    Diabetes Father     Diabetes Paternal Grandmother     Ovarian Cancer Mother     Diabetes Paternal Cousin         2    Colon Cancer Brother     Breast Cancer Maternal Aunt     Thyroid Cancer Other     Other Other         retinoblastoma    Other Son         suicide       Social History     Tobacco Use    Smoking status: Former     Current packs/day: 0.00     Average packs/day: 0.5 packs/day for 20.0 years (10.0 ttl pk-yrs)     Types: Cigarettes     Quit date: 2024     Years since quittin.3    Smokeless tobacco: Never   Substance Use Topics    Alcohol use: Yes     Comment: socially      Current Outpatient Medications   Medication Sig

## 2024-08-26 ENCOUNTER — OFFICE VISIT (OUTPATIENT)
Dept: PRIMARY CARE CLINIC | Age: 59
End: 2024-08-26
Payer: COMMERCIAL

## 2024-08-26 VITALS
DIASTOLIC BLOOD PRESSURE: 80 MMHG | BODY MASS INDEX: 38.67 KG/M2 | OXYGEN SATURATION: 92 % | HEIGHT: 61 IN | WEIGHT: 204.8 LBS | HEART RATE: 58 BPM | SYSTOLIC BLOOD PRESSURE: 114 MMHG

## 2024-08-26 DIAGNOSIS — I10 PRIMARY HYPERTENSION: Primary | ICD-10-CM

## 2024-08-26 PROCEDURE — 3074F SYST BP LT 130 MM HG: CPT | Performed by: PHYSICIAN ASSISTANT

## 2024-08-26 PROCEDURE — 3079F DIAST BP 80-89 MM HG: CPT | Performed by: PHYSICIAN ASSISTANT

## 2024-08-26 PROCEDURE — 99213 OFFICE O/P EST LOW 20 MIN: CPT | Performed by: PHYSICIAN ASSISTANT

## 2024-09-11 ENCOUNTER — TELEPHONE (OUTPATIENT)
Dept: PRIMARY CARE CLINIC | Age: 59
End: 2024-09-11

## 2024-09-12 PROBLEM — F17.210 CIGARETTE NICOTINE DEPENDENCE WITHOUT COMPLICATION: Status: RESOLVED | Noted: 2020-07-23 | Resolved: 2024-09-12

## 2024-09-12 PROBLEM — Z71.6 ENCOUNTER FOR TOBACCO USE CESSATION COUNSELING: Status: RESOLVED | Noted: 2020-07-23 | Resolved: 2024-09-12

## 2024-09-12 ASSESSMENT — ENCOUNTER SYMPTOMS
COLOR CHANGE: 0
SHORTNESS OF BREATH: 0
CHEST TIGHTNESS: 0
APNEA: 0
ABDOMINAL PAIN: 0
COUGH: 0

## 2024-09-13 ENCOUNTER — OFFICE VISIT (OUTPATIENT)
Dept: PRIMARY CARE CLINIC | Age: 59
End: 2024-09-13
Payer: COMMERCIAL

## 2024-09-13 VITALS
HEIGHT: 61 IN | HEART RATE: 60 BPM | BODY MASS INDEX: 38.89 KG/M2 | WEIGHT: 206 LBS | DIASTOLIC BLOOD PRESSURE: 100 MMHG | OXYGEN SATURATION: 94 % | SYSTOLIC BLOOD PRESSURE: 170 MMHG

## 2024-09-13 DIAGNOSIS — I10 PRIMARY HYPERTENSION: Primary | ICD-10-CM

## 2024-09-13 DIAGNOSIS — R09.89 BRUIT OF RIGHT CAROTID ARTERY: ICD-10-CM

## 2024-09-13 PROCEDURE — 99214 OFFICE O/P EST MOD 30 MIN: CPT | Performed by: PHYSICIAN ASSISTANT

## 2024-09-13 PROCEDURE — 3080F DIAST BP >= 90 MM HG: CPT | Performed by: PHYSICIAN ASSISTANT

## 2024-09-13 PROCEDURE — 93000 ELECTROCARDIOGRAM COMPLETE: CPT | Performed by: PHYSICIAN ASSISTANT

## 2024-09-13 PROCEDURE — 3077F SYST BP >= 140 MM HG: CPT | Performed by: PHYSICIAN ASSISTANT

## 2024-09-13 RX ORDER — NIFEDIPINE 30 MG
30 TABLET, EXTENDED RELEASE ORAL DAILY
Qty: 30 TABLET | Refills: 2 | Status: SHIPPED | OUTPATIENT
Start: 2024-09-13

## 2024-09-13 RX ORDER — LISINOPRIL 20 MG/1
40 TABLET ORAL DAILY
Qty: 60 TABLET | Refills: 2 | Status: SHIPPED | OUTPATIENT
Start: 2024-09-13

## 2024-09-13 RX ORDER — METOPROLOL SUCCINATE 50 MG/1
50 TABLET, EXTENDED RELEASE ORAL DAILY
Qty: 30 TABLET | Refills: 5 | Status: CANCELLED | OUTPATIENT
Start: 2024-09-13

## 2024-09-13 RX ORDER — LISINOPRIL AND HYDROCHLOROTHIAZIDE 20; 25 MG/1; MG/1
1 TABLET ORAL DAILY
Qty: 90 TABLET | Refills: 1 | Status: CANCELLED | OUTPATIENT
Start: 2024-09-13

## 2024-09-13 RX ORDER — METOPROLOL SUCCINATE 50 MG/1
50 TABLET, EXTENDED RELEASE ORAL DAILY
Qty: 30 TABLET | Refills: 2 | Status: SHIPPED | OUTPATIENT
Start: 2024-09-13

## 2024-09-13 ASSESSMENT — ENCOUNTER SYMPTOMS: BACK PAIN: 1

## 2024-09-17 ENCOUNTER — OFFICE VISIT (OUTPATIENT)
Dept: PRIMARY CARE CLINIC | Age: 59
End: 2024-09-17
Payer: COMMERCIAL

## 2024-09-17 VITALS
OXYGEN SATURATION: 96 % | WEIGHT: 204 LBS | HEIGHT: 61 IN | DIASTOLIC BLOOD PRESSURE: 70 MMHG | BODY MASS INDEX: 38.51 KG/M2 | SYSTOLIC BLOOD PRESSURE: 140 MMHG | HEART RATE: 65 BPM

## 2024-09-17 DIAGNOSIS — I10 PRIMARY HYPERTENSION: Primary | ICD-10-CM

## 2024-09-17 LAB
ANION GAP SERPL CALCULATED.3IONS-SCNC: 12 MEQ/L (ref 7–16)
BUN BLDV-MCNC: 20 MG/DL (ref 6–20)
CALCIUM SERPL-MCNC: 10 MG/DL (ref 8.5–10.5)
CHLORIDE BLD-SCNC: 103 MEQ/L (ref 95–107)
CO2: 27 MEQ/L (ref 19–31)
CREAT SERPL-MCNC: 0.78 MG/DL (ref 0.6–1.3)
EGFR IF NONAFRICAN AMERICAN: 88 ML/MIN/1.73
GLUCOSE: 138 MG/DL (ref 70–99)
POTASSIUM SERPL-SCNC: 4 MEQ/L (ref 3.5–5.4)
SODIUM BLD-SCNC: 142 MEQ/L (ref 133–146)

## 2024-09-17 PROCEDURE — 3078F DIAST BP <80 MM HG: CPT | Performed by: PHYSICIAN ASSISTANT

## 2024-09-17 PROCEDURE — 3077F SYST BP >= 140 MM HG: CPT | Performed by: PHYSICIAN ASSISTANT

## 2024-09-17 PROCEDURE — 99213 OFFICE O/P EST LOW 20 MIN: CPT | Performed by: PHYSICIAN ASSISTANT

## 2024-09-17 RX ORDER — NIFEDIPINE 60 MG/1
60 TABLET, EXTENDED RELEASE ORAL DAILY
Qty: 30 TABLET | Refills: 5 | Status: SHIPPED | OUTPATIENT
Start: 2024-09-17

## 2024-09-19 DIAGNOSIS — R09.89 BRUIT OF RIGHT CAROTID ARTERY: ICD-10-CM

## 2024-09-28 LAB
ALT SERPL-CCNC: 30 U/L (ref 5–33)
AST SERPL-CCNC: 20 U/L (ref 9–40)
BUN BLDV-MCNC: 13 MG/DL (ref 6–20)
CALCIUM SERPL-MCNC: 9.3 MG/DL (ref 8.6–10.5)
CHLORIDE BLD-SCNC: 99 MMOL/L (ref 96–107)
CO2: 30 MMOL/L (ref 18–32)
CREAT SERPL-MCNC: 0.6 MG/DL (ref 0.51–1.15)
EGFR IF NONAFRICAN AMERICAN: 103 ML/MIN/1.73M2
GLUCOSE: 94 MG/DL (ref 70–100)
POTASSIUM SERPL-SCNC: 4.1 MMOL/L (ref 3.5–5.4)
SODIUM BLD-SCNC: 139 MMOL/L (ref 135–148)

## 2024-09-30 ASSESSMENT — ENCOUNTER SYMPTOMS
APNEA: 0
CHEST TIGHTNESS: 0
COLOR CHANGE: 0
SHORTNESS OF BREATH: 0
COUGH: 0
ABDOMINAL PAIN: 0

## 2024-10-01 ENCOUNTER — OFFICE VISIT (OUTPATIENT)
Dept: PRIMARY CARE CLINIC | Age: 59
End: 2024-10-01
Payer: COMMERCIAL

## 2024-10-01 ENCOUNTER — HOSPITAL ENCOUNTER (OUTPATIENT)
Age: 59
Discharge: HOME | End: 2024-10-01
Payer: COMMERCIAL

## 2024-10-01 VITALS
OXYGEN SATURATION: 94 % | DIASTOLIC BLOOD PRESSURE: 74 MMHG | BODY MASS INDEX: 38.63 KG/M2 | HEIGHT: 61 IN | WEIGHT: 204.6 LBS | HEART RATE: 72 BPM | SYSTOLIC BLOOD PRESSURE: 120 MMHG

## 2024-10-01 DIAGNOSIS — S29.019A THORACIC MYOFASCIAL STRAIN, INITIAL ENCOUNTER: Primary | ICD-10-CM

## 2024-10-01 DIAGNOSIS — I10 ESSENTIAL HYPERTENSION: ICD-10-CM

## 2024-10-01 DIAGNOSIS — Z86.19 HISTORY OF RECENT ACUTE INFECTION: ICD-10-CM

## 2024-10-01 LAB
ANION GAP SERPL CALCULATED.3IONS-SCNC: 10 MMOL/L (ref 9–17)
BASOPHILS # BLD: 0.1 K/UL (ref 0–0.2)
BASOPHILS NFR BLD: 1 % (ref 0–2)
BUN SERPL-MCNC: 17 MG/DL (ref 6–20)
CALCIUM SERPL-MCNC: 9.7 MG/DL (ref 8.6–10.4)
CHLORIDE SERPL-SCNC: 103 MMOL/L (ref 98–107)
CO2 SERPL-SCNC: 28 MMOL/L (ref 20–31)
CREAT SERPL-MCNC: 0.7 MG/DL (ref 0.5–0.9)
EOSINOPHIL # BLD: 0.2 K/UL (ref 0–0.4)
EOSINOPHILS RELATIVE PERCENT: 2 % (ref 0–4)
ERYTHROCYTE [DISTWIDTH] IN BLOOD BY AUTOMATED COUNT: 13 % (ref 11.5–14.9)
GFR, ESTIMATED: >90 ML/MIN/1.73M2
GLUCOSE SERPL-MCNC: 119 MG/DL (ref 70–99)
HCT VFR BLD AUTO: 41.9 % (ref 36–46)
HGB BLD-MCNC: 13.7 G/DL (ref 12–16)
LYMPHOCYTES NFR BLD: 2.9 K/UL (ref 1–4.8)
LYMPHOCYTES RELATIVE PERCENT: 38 % (ref 24–44)
MCH RBC QN AUTO: 28.6 PG (ref 26–34)
MCHC RBC AUTO-ENTMCNC: 32.8 G/DL (ref 31–37)
MCV RBC AUTO: 87.1 FL (ref 80–100)
MONOCYTES NFR BLD: 0.9 K/UL (ref 0.1–1.3)
MONOCYTES NFR BLD: 11 % (ref 1–7)
NEUTROPHILS NFR BLD: 48 % (ref 36–66)
NEUTS SEG NFR BLD: 3.7 K/UL (ref 1.3–9.1)
PLATELET # BLD AUTO: 346 K/UL (ref 150–450)
PMV BLD AUTO: 8.3 FL (ref 6–12)
POTASSIUM SERPL-SCNC: 4.4 MMOL/L (ref 3.7–5.3)
RBC # BLD AUTO: 4.81 M/UL (ref 4–5.2)
SODIUM SERPL-SCNC: 141 MMOL/L (ref 135–144)
WBC OTHER # BLD: 7.7 K/UL (ref 3.5–11)

## 2024-10-01 PROCEDURE — 85025 COMPLETE CBC W/AUTO DIFF WBC: CPT

## 2024-10-01 PROCEDURE — 99213 OFFICE O/P EST LOW 20 MIN: CPT | Performed by: PHYSICIAN ASSISTANT

## 2024-10-01 PROCEDURE — 36415 COLL VENOUS BLD VENIPUNCTURE: CPT

## 2024-10-01 PROCEDURE — 80048 BASIC METABOLIC PNL TOTAL CA: CPT

## 2024-10-01 PROCEDURE — 3074F SYST BP LT 130 MM HG: CPT | Performed by: PHYSICIAN ASSISTANT

## 2024-10-01 PROCEDURE — 3078F DIAST BP <80 MM HG: CPT | Performed by: PHYSICIAN ASSISTANT

## 2024-10-01 RX ORDER — DICLOFENAC EPOLAMINE 0.01 G/1
1 SYSTEM TOPICAL 2 TIMES DAILY
Qty: 60 PATCH | Refills: 0 | Status: SHIPPED | OUTPATIENT
Start: 2024-10-01

## 2024-10-01 RX ORDER — LISINOPRIL 40 MG/1
40 TABLET ORAL DAILY
Qty: 30 TABLET | Refills: 0
Start: 2024-10-01

## 2024-10-01 NOTE — PROGRESS NOTES
[facility]  [facility] PRIMARY CARE  [address]  [place] [facility] [address]  Dept: [phone]    Robyn Delvalle is a 59 y.o. female who presents today for her medical conditions/complaints as noted below.      Chief Complaint   Patient presents with    Hypertension     Patient is here for a x2 week f/u -  patient states blood pressure has been pretty good        HPI:     HPI  BPs running well   Taking Nifedipine XL 60mg, metoprolol succinate 50mg and Lisinopril 40mg all once daily   BW and carotid scan are normal.   Had massage  and very next morning she felt feverish, achy and had shaking chills   [facility] for Covid and Flu--negative  She doesn't quite \"feel herself\" and she is scheduled for cosmetic surgery this Thursday    Also the pain in neck moved down to mid back now after massage    No components found for: \"LDLCHOLESTEROL\", \"LDLCALC\"    (goal LDL is <100)   AST (U/L)   Date Value   2024 20     ALT (U/L)   Date Value   2024 30     BUN (mg/dL)   Date Value   2024 13     BP Readings from Last 3 Encounters:   10/01/24 120/74   24 (!) 140/70   24 (!) 170/100          (goal 120/80)    Past Medical History:   Diagnosis Date    Hypertension       Past Surgical History:   Procedure Laterality Date    ADRENAL GLAND SURGERY Left      SECTION      HYSTERECTOMY (CERVIX STATUS UNKNOWN)      with family hx of ovarian cancer    OVARY REMOVAL      TONSILLECTOMY         Family History   Problem Relation Age of Onset    Diabetes Father     Diabetes Paternal Grandmother     Ovarian Cancer Mother     Diabetes Paternal Cousin         2    Colon Cancer Brother     Breast Cancer Maternal Aunt     Thyroid Cancer Other     Other Other         retinoblastoma    Other Son         suicide       Social History     Tobacco Use    Smoking status: Former     Current packs/day: 0.00     Average packs/day: 0.5 packs/day for 20.0 years (10.0 ttl pk-yrs)

## 2024-10-24 ENCOUNTER — TELEPHONE (OUTPATIENT)
Dept: PRIMARY CARE CLINIC | Age: 59
End: 2024-10-24

## 2024-11-12 RX ORDER — METOPROLOL SUCCINATE 50 MG/1
50 TABLET, EXTENDED RELEASE ORAL DAILY
Qty: 90 TABLET | Refills: 3 | Status: SHIPPED | OUTPATIENT
Start: 2024-11-12

## 2024-11-12 RX ORDER — NIFEDIPINE 60 MG/1
60 TABLET, EXTENDED RELEASE ORAL DAILY
Qty: 90 TABLET | Refills: 3 | Status: SHIPPED | OUTPATIENT
Start: 2024-11-12

## 2024-12-23 DIAGNOSIS — I10 ESSENTIAL HYPERTENSION: ICD-10-CM

## 2024-12-24 RX ORDER — LISINOPRIL 40 MG/1
40 TABLET ORAL DAILY
Qty: 30 TABLET | Refills: 5 | Status: SHIPPED | OUTPATIENT
Start: 2024-12-24

## 2025-02-08 ENCOUNTER — HOSPITAL ENCOUNTER (OUTPATIENT)
Dept: WOMENS IMAGING | Age: 60
Discharge: HOME | End: 2025-02-10
Payer: COMMERCIAL

## 2025-02-08 DIAGNOSIS — Z12.31 OTHER SCREENING MAMMOGRAM: ICD-10-CM

## 2025-02-08 PROCEDURE — 77063 BREAST TOMOSYNTHESIS BI: CPT

## 2025-03-21 ENCOUNTER — TELEPHONE (OUTPATIENT)
Dept: PRIMARY CARE CLINIC | Age: 60
End: 2025-03-21

## 2025-03-21 NOTE — TELEPHONE ENCOUNTER
Patient is requesting an appointment with PCP for bilateral leg pain for about 7 days. No known injury.    I offered patient the next available appointment on 03/31, however patient declined asking to be seen sooner.

## 2025-03-22 SDOH — ECONOMIC STABILITY: FOOD INSECURITY: WITHIN THE PAST 12 MONTHS, THE FOOD YOU BOUGHT JUST DIDN'T LAST AND YOU DIDN'T HAVE MONEY TO GET MORE.: NEVER TRUE

## 2025-03-22 SDOH — ECONOMIC STABILITY: INCOME INSECURITY: IN THE LAST 12 MONTHS, WAS THERE A TIME WHEN YOU WERE NOT ABLE TO PAY THE MORTGAGE OR RENT ON TIME?: NO

## 2025-03-22 SDOH — ECONOMIC STABILITY: FOOD INSECURITY: WITHIN THE PAST 12 MONTHS, YOU WORRIED THAT YOUR FOOD WOULD RUN OUT BEFORE YOU GOT MONEY TO BUY MORE.: NEVER TRUE

## 2025-03-22 SDOH — ECONOMIC STABILITY: TRANSPORTATION INSECURITY
IN THE PAST 12 MONTHS, HAS THE LACK OF TRANSPORTATION KEPT YOU FROM MEDICAL APPOINTMENTS OR FROM GETTING MEDICATIONS?: NO

## 2025-03-22 ASSESSMENT — PATIENT HEALTH QUESTIONNAIRE - PHQ9
1. LITTLE INTEREST OR PLEASURE IN DOING THINGS: NOT AT ALL
SUM OF ALL RESPONSES TO PHQ QUESTIONS 1-9: 0
SUM OF ALL RESPONSES TO PHQ QUESTIONS 1-9: 0
SUM OF ALL RESPONSES TO PHQ9 QUESTIONS 1 & 2: 0
2. FEELING DOWN, DEPRESSED OR HOPELESS: NOT AT ALL
SUM OF ALL RESPONSES TO PHQ QUESTIONS 1-9: 0
2. FEELING DOWN, DEPRESSED OR HOPELESS: NOT AT ALL
1. LITTLE INTEREST OR PLEASURE IN DOING THINGS: NOT AT ALL
SUM OF ALL RESPONSES TO PHQ QUESTIONS 1-9: 0

## 2025-03-23 NOTE — PROGRESS NOTES
[facility] Primary Care  [address] [address]  Phone: [phone]  Fax: [phone]    Robyn Delvalle is a 59 y.o. female who presents today for her medical conditions/complaintsas noted below.  Chief Complaint   Patient presents with    Foot Pain    Leg Pain     Patient is here leg pain - patient states pain started x3 weeks ago in right knee when she extended her legs - and left sciatic pain started x1 week ago -        HPI:     HPI  Bilateral leg pain: right knee pain and left leg pain . No remembered injury.  Knee started one morning. Hurts most to straighten it out . Left leg pain is along her hamstring and sometimes into buttock.    Reviewed hip x-ray that does show lumbar spondylosis with spurring. No current back pain.  Can not lay on side to sleep.    Current Outpatient Medications   Medication Sig Dispense Refill    naproxen (NAPROSYN) 500 MG tablet Take 1 tablet by mouth 2 times daily (with meals) 60 tablet 3    lisinopril (PRINIVIL;ZESTRIL) 40 MG tablet Take 1 tablet by mouth daily 30 tablet 5    metoprolol succinate (TOPROL XL) 50 MG extended release tablet Take 1 tablet by mouth daily 90 tablet 3    NIFEdipine (PROCARDIA XL) 60 MG extended release tablet Take 1 tablet by mouth daily 90 tablet 3    chlorhexidine (PERIDEX) 0.12 % solution RINSE WITH 1/2 OUNCE BY MOUTH TWICE DAILY AFTER BREAKFAST AND BEFORE BEDTIME (Patient not taking: Reported on 3/24/2025)  1     Current Facility-Administered Medications   Medication Dose Route Frequency Provider Last Rate Last Admin    zoster recombinant adjuvanted vaccine (SHINGRIX) injection 50 mcg  0.5 mL IntraMUSCular Once Pawan Mack PA         Allergies   Allergen Reactions    No Known Allergies        Subjective:      Review of Systems   Musculoskeletal:  Positive for arthralgias (right knee) and myalgias. Negative for back pain, joint swelling and neck pain.   Hematological:  Does not bruise/bleed easily.

## 2025-03-24 ENCOUNTER — OFFICE VISIT (OUTPATIENT)
Dept: PRIMARY CARE CLINIC | Age: 60
End: 2025-03-24
Payer: COMMERCIAL

## 2025-03-24 VITALS
WEIGHT: 194.4 LBS | HEART RATE: 66 BPM | OXYGEN SATURATION: 95 % | SYSTOLIC BLOOD PRESSURE: 130 MMHG | BODY MASS INDEX: 36.7 KG/M2 | HEIGHT: 61 IN | DIASTOLIC BLOOD PRESSURE: 80 MMHG

## 2025-03-24 DIAGNOSIS — M79.605 LEFT LEG PAIN: ICD-10-CM

## 2025-03-24 DIAGNOSIS — M25.561 ACUTE PAIN OF RIGHT KNEE: Primary | ICD-10-CM

## 2025-03-24 PROCEDURE — 3075F SYST BP GE 130 - 139MM HG: CPT | Performed by: PHYSICIAN ASSISTANT

## 2025-03-24 PROCEDURE — 99214 OFFICE O/P EST MOD 30 MIN: CPT | Performed by: PHYSICIAN ASSISTANT

## 2025-03-24 PROCEDURE — 3079F DIAST BP 80-89 MM HG: CPT | Performed by: PHYSICIAN ASSISTANT

## 2025-03-24 RX ORDER — NAPROXEN 500 MG/1
500 TABLET ORAL 2 TIMES DAILY WITH MEALS
Qty: 60 TABLET | Refills: 3 | Status: SHIPPED | OUTPATIENT
Start: 2025-03-24

## 2025-03-24 ASSESSMENT — ENCOUNTER SYMPTOMS: BACK PAIN: 0

## 2025-03-27 ENCOUNTER — HOSPITAL ENCOUNTER (OUTPATIENT)
Dept: GENERAL RADIOLOGY | Age: 60
Discharge: HOME | End: 2025-03-29
Payer: COMMERCIAL

## 2025-03-27 ENCOUNTER — HOSPITAL ENCOUNTER (OUTPATIENT)
Age: 60
Discharge: HOME | End: 2025-03-29
Payer: COMMERCIAL

## 2025-03-27 DIAGNOSIS — M25.561 ACUTE PAIN OF RIGHT KNEE: ICD-10-CM

## 2025-03-27 PROCEDURE — 73562 X-RAY EXAM OF KNEE 3: CPT

## 2025-03-31 ENCOUNTER — HOSPITAL ENCOUNTER (OUTPATIENT)
Age: 60
Setting detail: THERAPIES SERIES
Discharge: HOME | End: 2025-03-31
Payer: COMMERCIAL

## 2025-03-31 ENCOUNTER — RESULTS FOLLOW-UP (OUTPATIENT)
Dept: PRIMARY CARE CLINIC | Age: 60
End: 2025-03-31

## 2025-03-31 PROCEDURE — 97110 THERAPEUTIC EXERCISES: CPT

## 2025-03-31 PROCEDURE — 97162 PT EVAL MOD COMPLEX 30 MIN: CPT

## 2025-03-31 NOTE — CONSULTS
[facility] Rehabilitation &  Therapy  [address]  [place] [address]  [phone]  F: [phone]     Physical Therapy Spine Evaluation    Date:  3/31/2025  Patient: Robyn Delvalle  : 1965  MRN: [id number]  Physician: Charlee Pratt MD     Insurance: ANTHEM ;EMBEDDED COINS/$1000/$958.41 LEFT/10%; 60/60 VISITS   Medical Diagnosis: (R) knee pain M25.561; (L) leg pain M79.605  Rehab Codes: (R) knee pain M25.561; (L) leg pain M79.605  Onset Date: referral 3/24/25  Next 's appt.: PRN  Visit Count:    Cancel/No Show: 0/0    Subjective:   Patient reports to therapy with complaints of pain in the (R) anterior knee and then additional pain in the (L) IT band/lateral femur area.  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  Patient noted increased complaints of (L) lateral femur symptoms after this.  Stated that she does not have any type of additional back pain or history of significant back pain.  Patient did have a history of a fall about a year ago with completely doing splits- was completely black and blue in the (L) LE at that time for about a month but generally functional after this.  Currently notes increased complaints of (R) knee pain with attempting to transfer sit to stand, with repetitive walking at work, transferring on/off fork lift, stairs at work.  Notes increased complaints of (L) sided IT band symptoms with directly laying on her side and ends up sleeping on her (R) side or back \"but is not a back sleeper.\"  CC: complaints of (R) anterior knee pain, (L) sided IT band lateral femur pain  HPI: insidious onset of symptoms about 3 months ago prompted MD appointment and referral to therapy 3/24/25    PMHx: [] Unremarkable [] Diabetes [x] HTN  [] Pacemaker   []

## 2025-04-01 NOTE — FLOWSHEET NOTE
[facility] Rehabilitation &  Therapy  [address]  [place] [address]  [phone]  F: [phone]     Physical Therapy Daily Treatment Note    Date:  2025   Patient Name:  Robyn Delvalle    :  1965  MRN: [id number]  Physician: Charlee Pratt MD                                Insurance: ANTHEM ;EMBEDDED COINS/$1000/$958.41 LEFT/10%; 60/60 VISITS   Medical Diagnosis: (R) knee pain M25.561; (L) leg pain M79.605             Rehab Codes: (R) knee pain M25.561; (L) leg pain M79.605  Onset Date: referral 3/24/25             Next 's appt.: PRN    Visit Count:                                 Cancel/No Show: 0/0    Subjective:    Pain:  [x] Yes  [] No Location: right knee with transitions and with prolonged standing/walking. Left LE sx minimal. Pain Rating: (0-10 scale) 2-3/10  Pain altered Tx:  [x] No  [] Yes  Action:    Comments:  Pt reports decreased sx bilaterally since her last session.  Pt states left sx are minimal and seem to be resolving, right knee pain 2-3/10.  States that she has been more aware of her gait mechanics and performing HEP.    Objective: several gait deviations noted, addressed with gait education and strengthening/stretching activities. Right LE muscle tightness greater than left LE.          Today’s Treatment:    Modalities:   Precautions:    Exercise Reps/ Time Weight/ Level Comments   SciFit 5 min  L1    Standing calf stretch  20' x 4     squats X 10      3-way hip  X 10   ABD only    Bilateral hip hike X 10  4\" Bilaterally    Reviewed patellar tendon strap  reviewed        Standing knee flexion stretch 10 x 5\"       Modified piriformis stretch 5 x 5\"   towel   Hamstring stretch   15' x 4        Ice massage (R) patellar tendon  reviewed        Other: right knee AP/PA mobs grade IV 10\" x 5 each     Response to treatment: Pt reports some minor right knee discomfort randomly throughout session, minimal to no left LE sx.  Gait

## 2025-04-02 ENCOUNTER — HOSPITAL ENCOUNTER (OUTPATIENT)
Age: 60
Setting detail: THERAPIES SERIES
Discharge: HOME | End: 2025-04-02
Payer: COMMERCIAL

## 2025-04-02 PROCEDURE — 97110 THERAPEUTIC EXERCISES: CPT

## 2025-04-08 ENCOUNTER — HOSPITAL ENCOUNTER (OUTPATIENT)
Age: 60
Setting detail: THERAPIES SERIES
Discharge: HOME | End: 2025-04-08
Payer: COMMERCIAL

## 2025-04-08 NOTE — FLOWSHEET NOTE
[facility] Rehabilitation &  Therapy  [address]  [place] [address]  [phone]  F: [phone]     Physical Therapy Cancel/No Show note    Date: 2025  Patient: Robyn Delvalle  : 1965  MRN: [id number]    Cancels/No Shows to date:     For today's appointment patient:    [x]  Cancelled    [] Rescheduled appointment    [] No-show     Reason given by patient:    [x]  Patient ill    []  Conflicting appointment    [] No transportation      [] Conflict with work    [] No reason given    [] Weather related    [] COVID-19    [] Other:      Comments:        [x] Next appointment was confirmed    Electronically signed by: Carlos Cano PTA

## 2025-04-10 ENCOUNTER — HOSPITAL ENCOUNTER (OUTPATIENT)
Age: 60
Setting detail: THERAPIES SERIES
Discharge: HOME | End: 2025-04-10
Payer: COMMERCIAL

## 2025-04-10 NOTE — FLOWSHEET NOTE
[facility] Rehabilitation &  Therapy  [address]  [place] [address]  [phone]  F: [phone]     Physical Therapy Cancel/No Show note    Date: 4/10/2025  Patient: Robyn Delvalle  : 1965  MRN: [id number]    Cancels/No Shows to date:     For today's appointment patient:    [x]  Cancelled    [] Rescheduled appointment    [] No-show     Reason given by patient:    [x]  Patient ill    []  Conflicting appointment    [] No transportation      [] Conflict with work    [] No reason given    [] Weather related    [] COVID-19    [] Other:      Comments:  Pt states she will call to schedule more visits after she sees her doctor.       [] Next appointment was confirmed    Electronically signed by: Carlos Cano PTA

## 2025-05-15 DIAGNOSIS — I10 ESSENTIAL HYPERTENSION: ICD-10-CM

## 2025-05-15 RX ORDER — LISINOPRIL 40 MG/1
40 TABLET ORAL DAILY
Qty: 90 TABLET | Refills: 1 | Status: SHIPPED | OUTPATIENT
Start: 2025-05-15